# Patient Record
Sex: MALE | Race: WHITE | NOT HISPANIC OR LATINO | Employment: OTHER | URBAN - METROPOLITAN AREA
[De-identification: names, ages, dates, MRNs, and addresses within clinical notes are randomized per-mention and may not be internally consistent; named-entity substitution may affect disease eponyms.]

---

## 2018-10-27 ENCOUNTER — HOSPITAL ENCOUNTER (OUTPATIENT)
Dept: RADIOLOGY | Facility: MEDICAL CENTER | Age: 83
End: 2018-10-27

## 2018-10-27 ENCOUNTER — HOSPITAL ENCOUNTER (INPATIENT)
Facility: MEDICAL CENTER | Age: 83
LOS: 3 days | DRG: 552 | End: 2018-10-30
Attending: EMERGENCY MEDICINE | Admitting: HOSPITALIST
Payer: MEDICARE

## 2018-10-27 DIAGNOSIS — S12.112A CLOSED NONDISPLACED ODONTOID FRACTURE WITH TYPE II MORPHOLOGY, INITIAL ENCOUNTER (HCC): ICD-10-CM

## 2018-10-27 DIAGNOSIS — J96.11 CHRONIC RESPIRATORY FAILURE WITH HYPOXIA (HCC): ICD-10-CM

## 2018-10-27 PROBLEM — I48.91 AF (ATRIAL FIBRILLATION) (HCC): Status: ACTIVE | Noted: 2018-10-27

## 2018-10-27 PROBLEM — S12.110A DENS FRACTURE (HCC): Status: ACTIVE | Noted: 2018-10-27

## 2018-10-27 PROBLEM — E78.5 HLD (HYPERLIPIDEMIA): Status: ACTIVE | Noted: 2018-10-27

## 2018-10-27 PROBLEM — I50.9 CHF (CONGESTIVE HEART FAILURE) (HCC): Status: ACTIVE | Noted: 2018-10-27

## 2018-10-27 LAB — EKG IMPRESSION: NORMAL

## 2018-10-27 PROCEDURE — 93005 ELECTROCARDIOGRAM TRACING: CPT | Performed by: EMERGENCY MEDICINE

## 2018-10-27 PROCEDURE — 770001 HCHG ROOM/CARE - MED/SURG/GYN PRIV*

## 2018-10-27 PROCEDURE — 304561 HCHG STAT O2

## 2018-10-27 PROCEDURE — 99285 EMERGENCY DEPT VISIT HI MDM: CPT

## 2018-10-27 PROCEDURE — 36415 COLL VENOUS BLD VENIPUNCTURE: CPT

## 2018-10-27 PROCEDURE — 700102 HCHG RX REV CODE 250 W/ 637 OVERRIDE(OP): Performed by: HOSPITALIST

## 2018-10-27 PROCEDURE — 99223 1ST HOSP IP/OBS HIGH 75: CPT | Performed by: HOSPITALIST

## 2018-10-27 PROCEDURE — A9270 NON-COVERED ITEM OR SERVICE: HCPCS | Performed by: HOSPITALIST

## 2018-10-27 PROCEDURE — 93005 ELECTROCARDIOGRAM TRACING: CPT

## 2018-10-27 RX ORDER — METOPROLOL SUCCINATE 25 MG/1
25 TABLET, EXTENDED RELEASE ORAL DAILY
COMMUNITY

## 2018-10-27 RX ORDER — PRAVASTATIN SODIUM 10 MG
10 TABLET ORAL NIGHTLY
COMMUNITY

## 2018-10-27 RX ORDER — GUAIFENESIN/DEXTROMETHORPHAN 100-10MG/5
10 SYRUP ORAL EVERY 6 HOURS PRN
Status: DISCONTINUED | OUTPATIENT
Start: 2018-10-27 | End: 2018-10-30 | Stop reason: HOSPADM

## 2018-10-27 RX ORDER — ACETAMINOPHEN 325 MG/1
650 TABLET ORAL EVERY 6 HOURS PRN
Status: DISCONTINUED | OUTPATIENT
Start: 2018-10-27 | End: 2018-10-30 | Stop reason: HOSPADM

## 2018-10-27 RX ORDER — TRAMADOL HYDROCHLORIDE 50 MG/1
50 TABLET ORAL EVERY 6 HOURS PRN
Status: DISCONTINUED | OUTPATIENT
Start: 2018-10-27 | End: 2018-10-30 | Stop reason: HOSPADM

## 2018-10-27 RX ORDER — BISACODYL 10 MG
10 SUPPOSITORY, RECTAL RECTAL
Status: DISCONTINUED | OUTPATIENT
Start: 2018-10-27 | End: 2018-10-30 | Stop reason: HOSPADM

## 2018-10-27 RX ORDER — AMOXICILLIN 250 MG
2 CAPSULE ORAL 2 TIMES DAILY
Status: DISCONTINUED | OUTPATIENT
Start: 2018-10-27 | End: 2018-10-30 | Stop reason: HOSPADM

## 2018-10-27 RX ORDER — PRAVASTATIN SODIUM 10 MG
10 TABLET ORAL NIGHTLY
Status: DISCONTINUED | OUTPATIENT
Start: 2018-10-27 | End: 2018-10-30 | Stop reason: HOSPADM

## 2018-10-27 RX ORDER — POTASSIUM CHLORIDE 20 MEQ/1
20 TABLET, EXTENDED RELEASE ORAL 2 TIMES DAILY
Status: DISCONTINUED | OUTPATIENT
Start: 2018-10-27 | End: 2018-10-30 | Stop reason: HOSPADM

## 2018-10-27 RX ORDER — POLYETHYLENE GLYCOL 3350 17 G/17G
1 POWDER, FOR SOLUTION ORAL
Status: DISCONTINUED | OUTPATIENT
Start: 2018-10-27 | End: 2018-10-30 | Stop reason: HOSPADM

## 2018-10-27 RX ORDER — FUROSEMIDE 40 MG/1
40 TABLET ORAL EVERY MORNING
Status: DISCONTINUED | OUTPATIENT
Start: 2018-10-28 | End: 2018-10-28

## 2018-10-27 RX ORDER — METOPROLOL SUCCINATE 25 MG/1
25 TABLET, EXTENDED RELEASE ORAL DAILY
Status: DISCONTINUED | OUTPATIENT
Start: 2018-10-28 | End: 2018-10-30 | Stop reason: HOSPADM

## 2018-10-27 RX ORDER — FUROSEMIDE 20 MG/1
40 TABLET ORAL EVERY MORNING
Status: ON HOLD | COMMUNITY
End: 2018-10-28

## 2018-10-27 RX ORDER — TEMAZEPAM 15 MG/1
7.5 CAPSULE ORAL NIGHTLY PRN
Status: ON HOLD | COMMUNITY
End: 2018-10-28

## 2018-10-27 RX ORDER — POTASSIUM CHLORIDE 20 MEQ/1
20 TABLET, EXTENDED RELEASE ORAL 3 TIMES DAILY
COMMUNITY

## 2018-10-27 RX ADMIN — POTASSIUM CHLORIDE 20 MEQ: 1500 TABLET, EXTENDED RELEASE ORAL at 21:24

## 2018-10-27 RX ADMIN — STANDARDIZED SENNA CONCENTRATE AND DOCUSATE SODIUM 2 TABLET: 8.6; 5 TABLET, FILM COATED ORAL at 21:24

## 2018-10-27 RX ADMIN — PRAVASTATIN SODIUM 10 MG: 10 TABLET ORAL at 21:54

## 2018-10-27 ASSESSMENT — COGNITIVE AND FUNCTIONAL STATUS - GENERAL
MOBILITY SCORE: 12
DAILY ACTIVITIY SCORE: 19
SUGGESTED CMS G CODE MODIFIER MOBILITY: CL
WALKING IN HOSPITAL ROOM: A LOT
SUGGESTED CMS G CODE MODIFIER DAILY ACTIVITY: CK
HELP NEEDED FOR BATHING: A LITTLE
DRESSING REGULAR LOWER BODY CLOTHING: A LITTLE
MOVING FROM LYING ON BACK TO SITTING ON SIDE OF FLAT BED: A LOT
TURNING FROM BACK TO SIDE WHILE IN FLAT BAD: A LITTLE
DRESSING REGULAR UPPER BODY CLOTHING: A LITTLE
MOVING TO AND FROM BED TO CHAIR: A LOT
CLIMB 3 TO 5 STEPS WITH RAILING: TOTAL
STANDING UP FROM CHAIR USING ARMS: A LOT
TOILETING: A LOT

## 2018-10-27 ASSESSMENT — ENCOUNTER SYMPTOMS
SENSORY CHANGE: 0
DIARRHEA: 0
CHILLS: 0
TINGLING: 0
PHOTOPHOBIA: 0
SHORTNESS OF BREATH: 0
DIZZINESS: 0
MYALGIAS: 0
SORE THROAT: 0
WHEEZING: 0
NECK PAIN: 1
ABDOMINAL PAIN: 0
PALPITATIONS: 0
FEVER: 0
FOCAL WEAKNESS: 0
HEADACHES: 0
VOMITING: 0
COUGH: 0
NAUSEA: 0
DEPRESSION: 0

## 2018-10-27 ASSESSMENT — COPD QUESTIONNAIRES
COPD SCREENING SCORE: 4
COPD SCREENING SCORE: 3
DURING THE PAST 4 WEEKS HOW MUCH DID YOU FEEL SHORT OF BREATH: NONE/LITTLE OF THE TIME
DO YOU EVER COUGH UP ANY MUCUS OR PHLEGM?: NO/ONLY WITH OCCASIONAL COLDS OR INFECTIONS
HAVE YOU SMOKED AT LEAST 100 CIGARETTES IN YOUR ENTIRE LIFE: YES
HAVE YOU SMOKED AT LEAST 100 CIGARETTES IN YOUR ENTIRE LIFE: YES
DURING THE PAST 4 WEEKS HOW MUCH DID YOU FEEL SHORT OF BREATH: NONE/LITTLE OF THE TIME
DO YOU EVER COUGH UP ANY MUCUS OR PHLEGM?: NO/ONLY WITH OCCASIONAL COLDS OR INFECTIONS

## 2018-10-27 ASSESSMENT — LIFESTYLE VARIABLES
DO YOU DRINK ALCOHOL: NO
EVER_SMOKED: YES

## 2018-10-27 ASSESSMENT — PAIN SCALES - GENERAL: PAINLEVEL_OUTOF10: 0

## 2018-10-27 ASSESSMENT — PATIENT HEALTH QUESTIONNAIRE - PHQ9
2. FEELING DOWN, DEPRESSED, IRRITABLE, OR HOPELESS: NOT AT ALL
SUM OF ALL RESPONSES TO PHQ9 QUESTIONS 1 AND 2: 0
1. LITTLE INTEREST OR PLEASURE IN DOING THINGS: NOT AT ALL

## 2018-10-27 NOTE — ED TRIAGE NOTES
Ankur Negron  Pt [facility]. Pt changed into gown and placed on monitor.     Chief Complaint   Patient presents with   • T-5000 GLF     Pt transferred from [facility]. Per report, pt had a MGLF yesterday at his home. Pt stated that neck was stiff after the fall. Pt was seen in [facility] ED d/t neck pain and CT was done of cervical spine. Pt was found to have nondisplaced transverse fracture of the odontoid process of C2. Pt in C-spine precautions upon arrival. Pt was also found to have benign meningioma overlying the superior right parietal lobe. No neuro deficits noted. Pt transferred for neuro consult and [facility] level of care.  Chart up and ready for ERP now.

## 2018-10-28 PROBLEM — J96.11 CHRONIC RESPIRATORY FAILURE WITH HYPOXIA (HCC): Status: ACTIVE | Noted: 2018-10-28

## 2018-10-28 PROBLEM — R13.10 DYSPHAGIA: Status: ACTIVE | Noted: 2018-10-28

## 2018-10-28 PROBLEM — E55.9 VITAMIN D DEFICIENCY: Status: ACTIVE | Noted: 2018-10-28

## 2018-10-28 PROBLEM — N18.9 CHRONIC RENAL IMPAIRMENT: Status: ACTIVE | Noted: 2018-10-28

## 2018-10-28 PROBLEM — I48.20 CHRONIC ATRIAL FIBRILLATION (HCC): Status: ACTIVE | Noted: 2018-10-27

## 2018-10-28 PROBLEM — D53.9 MACROCYTIC ANEMIA: Status: ACTIVE | Noted: 2018-10-28

## 2018-10-28 PROBLEM — D72.829 LEUKOCYTOSIS: Status: ACTIVE | Noted: 2018-10-28

## 2018-10-28 LAB
ANION GAP SERPL CALC-SCNC: 7 MMOL/L (ref 0–11.9)
BUN SERPL-MCNC: 23 MG/DL (ref 8–22)
CALCIUM SERPL-MCNC: 9.1 MG/DL (ref 8.5–10.5)
CHLORIDE SERPL-SCNC: 105 MMOL/L (ref 96–112)
CO2 SERPL-SCNC: 28 MMOL/L (ref 20–33)
CREAT SERPL-MCNC: 1.11 MG/DL (ref 0.5–1.4)
ERYTHROCYTE [DISTWIDTH] IN BLOOD BY AUTOMATED COUNT: 54.2 FL (ref 35.9–50)
GLUCOSE SERPL-MCNC: 111 MG/DL (ref 65–99)
HCT VFR BLD AUTO: 37.5 % (ref 42–52)
HGB BLD-MCNC: 12.9 G/DL (ref 14–18)
MCH RBC QN AUTO: 34.5 PG (ref 27–33)
MCHC RBC AUTO-ENTMCNC: 34.4 G/DL (ref 33.7–35.3)
MCV RBC AUTO: 100.3 FL (ref 81.4–97.8)
PLATELET # BLD AUTO: 277 K/UL (ref 164–446)
PMV BLD AUTO: 10.2 FL (ref 9–12.9)
POTASSIUM SERPL-SCNC: 3.8 MMOL/L (ref 3.6–5.5)
RBC # BLD AUTO: 3.74 M/UL (ref 4.7–6.1)
SODIUM SERPL-SCNC: 140 MMOL/L (ref 135–145)
WBC # BLD AUTO: 11 K/UL (ref 4.8–10.8)

## 2018-10-28 PROCEDURE — 700102 HCHG RX REV CODE 250 W/ 637 OVERRIDE(OP): Performed by: NURSE PRACTITIONER

## 2018-10-28 PROCEDURE — G8979 MOBILITY GOAL STATUS: HCPCS | Mod: CI

## 2018-10-28 PROCEDURE — 97161 PT EVAL LOW COMPLEX 20 MIN: CPT

## 2018-10-28 PROCEDURE — 85027 COMPLETE CBC AUTOMATED: CPT

## 2018-10-28 PROCEDURE — A9270 NON-COVERED ITEM OR SERVICE: HCPCS | Performed by: NURSE PRACTITIONER

## 2018-10-28 PROCEDURE — G8978 MOBILITY CURRENT STATUS: HCPCS | Mod: CK

## 2018-10-28 PROCEDURE — A9270 NON-COVERED ITEM OR SERVICE: HCPCS | Performed by: HOSPITALIST

## 2018-10-28 PROCEDURE — 770001 HCHG ROOM/CARE - MED/SURG/GYN PRIV*

## 2018-10-28 PROCEDURE — 700102 HCHG RX REV CODE 250 W/ 637 OVERRIDE(OP): Performed by: HOSPITALIST

## 2018-10-28 PROCEDURE — 99232 SBSQ HOSP IP/OBS MODERATE 35: CPT | Mod: GC | Performed by: INTERNAL MEDICINE

## 2018-10-28 PROCEDURE — 36415 COLL VENOUS BLD VENIPUNCTURE: CPT

## 2018-10-28 PROCEDURE — 80048 BASIC METABOLIC PNL TOTAL CA: CPT

## 2018-10-28 RX ORDER — FUROSEMIDE 80 MG
80 TABLET ORAL DAILY
COMMUNITY

## 2018-10-28 RX ORDER — FUROSEMIDE 40 MG/1
80 TABLET ORAL EVERY MORNING
Status: DISCONTINUED | OUTPATIENT
Start: 2018-10-29 | End: 2018-10-30 | Stop reason: HOSPADM

## 2018-10-28 RX ADMIN — ACETAMINOPHEN 650 MG: 325 TABLET, FILM COATED ORAL at 04:47

## 2018-10-28 RX ADMIN — POTASSIUM CHLORIDE 20 MEQ: 1500 TABLET, EXTENDED RELEASE ORAL at 04:47

## 2018-10-28 RX ADMIN — RIVAROXABAN 15 MG: 15 TABLET, FILM COATED ORAL at 04:47

## 2018-10-28 RX ADMIN — PRAVASTATIN SODIUM 10 MG: 10 TABLET ORAL at 22:29

## 2018-10-28 RX ADMIN — VITAMIN D, TAB 1000IU (100/BT) 1000 UNITS: 25 TAB at 17:38

## 2018-10-28 RX ADMIN — FUROSEMIDE 40 MG: 40 TABLET ORAL at 04:47

## 2018-10-28 RX ADMIN — STANDARDIZED SENNA CONCENTRATE AND DOCUSATE SODIUM 2 TABLET: 8.6; 5 TABLET, FILM COATED ORAL at 04:47

## 2018-10-28 RX ADMIN — POTASSIUM CHLORIDE 20 MEQ: 1500 TABLET, EXTENDED RELEASE ORAL at 17:38

## 2018-10-28 RX ADMIN — STANDARDIZED SENNA CONCENTRATE AND DOCUSATE SODIUM 2 TABLET: 8.6; 5 TABLET, FILM COATED ORAL at 17:38

## 2018-10-28 RX ADMIN — METOPROLOL SUCCINATE 25 MG: 25 TABLET, EXTENDED RELEASE ORAL at 04:47

## 2018-10-28 ASSESSMENT — ENCOUNTER SYMPTOMS
WEAKNESS: 0
DEPRESSION: 0
FEVER: 0
SPUTUM PRODUCTION: 0
PALPITATIONS: 0
CLAUDICATION: 0
VOMITING: 0
NERVOUS/ANXIOUS: 0
ORTHOPNEA: 0
SPEECH CHANGE: 0
TREMORS: 0
HEADACHES: 0
NAUSEA: 0
WHEEZING: 0
ABDOMINAL PAIN: 0
PND: 0
HEMOPTYSIS: 0
TINGLING: 0
FOCAL WEAKNESS: 0
LOSS OF CONSCIOUSNESS: 0
DIAPHORESIS: 0
DIZZINESS: 0
SHORTNESS OF BREATH: 0
SENSORY CHANGE: 0
CONSTIPATION: 0
BLOOD IN STOOL: 0
CHILLS: 0
COUGH: 0
BRUISES/BLEEDS EASILY: 1
INSOMNIA: 0
DIARRHEA: 0

## 2018-10-28 ASSESSMENT — GAIT ASSESSMENTS
DISTANCE (FEET): 100
GAIT LEVEL OF ASSIST: CONTACT GUARD ASSIST
ASSISTIVE DEVICE: FRONT WHEEL WALKER

## 2018-10-28 ASSESSMENT — COGNITIVE AND FUNCTIONAL STATUS - GENERAL
CLIMB 3 TO 5 STEPS WITH RAILING: A LOT
WALKING IN HOSPITAL ROOM: A LITTLE
MOVING TO AND FROM BED TO CHAIR: A LITTLE
MOVING FROM LYING ON BACK TO SITTING ON SIDE OF FLAT BED: A LITTLE
TURNING FROM BACK TO SIDE WHILE IN FLAT BAD: A LITTLE
MOBILITY SCORE: 17
STANDING UP FROM CHAIR USING ARMS: A LITTLE
SUGGESTED CMS G CODE MODIFIER MOBILITY: CK

## 2018-10-28 ASSESSMENT — PAIN SCALES - GENERAL
PAINLEVEL_OUTOF10: 0

## 2018-10-28 ASSESSMENT — PATIENT HEALTH QUESTIONNAIRE - PHQ9
1. LITTLE INTEREST OR PLEASURE IN DOING THINGS: NOT AT ALL
2. FEELING DOWN, DEPRESSED, IRRITABLE, OR HOPELESS: NOT AT ALL
SUM OF ALL RESPONSES TO PHQ9 QUESTIONS 1 AND 2: 0

## 2018-10-28 NOTE — ED NOTES
Received report. Assumed care of pt at this time. Pt alert and oriented x4, GCS 15, resp even and nl. Pt on full cardiac monitor. Bed locked in low position, call light within reach. Denies needs at this time. Will continue to monitor.

## 2018-10-28 NOTE — CARE PLAN
Problem: Bowel/Gastric:  Goal: Will not experience complications related to bowel motility  Outcome: PROGRESSING AS EXPECTED      Problem: Knowledge Deficit  Goal: Knowledge of disease process/condition, treatment plan, diagnostic tests, and medications will improve  Outcome: PROGRESSING AS EXPECTED

## 2018-10-28 NOTE — CONSULTS
DATE OF SERVICE:  10/28/2018    ADMITTING DIAGNOSIS:  Ground level fall with odontoid fracture, type 2.    HISTORY OF PRESENT ILLNESS:  This 89-year-old patient suffered a ground level   fall on 10/27/2018.  He was initially evaluated in [place] at the local   facility and was then transferred to the [facility] for   further workup and care based on the finding of an odontoid fracture.  Dr. Tong Boyce, neurosurgery was consulted.    PAST MEDICAL HISTORY:  Pertinent for atrial fibrillation, chronic kidney   disease, congestive heart failure, dyslipidemia, hypertension, obstructive   sleep apnea.    SURGICAL HISTORY:  Denies.    ADMITTING MEDICATIONS:  Include metoprolol SR, pravastatin, Xarelto, Restoril.    ALLERGIES:  No known allergies.    REVIEW OF SYSTEMS:  Negative for any acute changes in his cardiopulmonary   status.  He remains continent of bowel and bladder.  He has had no untoward   bleeding while on the Xarelto.    PHYSICAL EXAMINATION:  This patient is awake, alert and oriented.  He is   presenting in a [place] J collar.  He is neuromotor and neurosensory ntact.    IMPRESSION AND PLAN:  Type 2 odontoid fracture with significant ankylosis.    PLAN:  At this point, no neurosurgical fixation of the odontoid fracture is   planned.  We are recommending strict bracing.  Based on the patient's age and the   nature and location of the fracture, bracing may require 3-6 months before   healing.  A bone growth stimulator may be considered on an outpatient basis.    We will also recommend continued observation and repeat CT scan in the next   few days to ensure that the fracture remained stable.  The patient may be   started on therapies.       ____________________________________     LUIS MORRELL / NESTOR    DD:  10/28/2018 11:01:04  DT:  10/28/2018 11:22:46    D#:  [id number]  Job#:  [id number]

## 2018-10-28 NOTE — H&P
Hospital Medicine History & Physical Note    Date of Service  10/27/2018    Primary Care Physician  No primary care provider on file.    Consultants  Dr. Boyce, neurosurgery    Code Status  Full    Chief Complaint  Chief Complaint   Patient presents with   • T-5000 GLF       History of Presenting Illness  89 y.o. male who presented on 10/27/2018 with neck pain after a fall.  The patient states that he typically ambulates with a frontwheel walker.  He was in his hallway in his home when his pants fell down, he let go of his walker to pull up his pants when his left leg gave out and he fell forward and struck the left front part of his head.  He denies any loss of consciousness.  He denies any preceding headache, chest pain, shortness of breath, or dizziness.  He was able to get up on his own and had some neck soreness but he did not seek immediate medical assistance and in fact was able to sleep for the rest of last night.  Today, his neck is painful and he was planning on putting a hot pack on it when he could not find it and his nephew decided to call EMS.  Patient was seen at an outside facility where he was noted to have a dens fracture on CT imaging.  He was transferred to our hospital for higher level of care and specialist consultation.    Review of Systems  Review of Systems   Constitutional: Negative for chills and fever.   HENT: Negative for congestion and sore throat.    Eyes: Negative for photophobia.   Respiratory: Negative for cough, shortness of breath and wheezing.    Cardiovascular: Negative for chest pain and palpitations.   Gastrointestinal: Negative for abdominal pain, diarrhea, nausea and vomiting.   Genitourinary: Negative for dysuria.   Musculoskeletal: Positive for neck pain (Now resolved). Negative for myalgias.   Skin: Negative.    Neurological: Negative for dizziness, tingling, focal weakness and headaches.   Psychiatric/Behavioral: Negative for depression and suicidal ideas.       Past  Medical History  Past Medical History:   Diagnosis Date   • Atrial fibrillation (HCC)    • Chronic ischemic heart disease    • Chronic renal impairment    • Congestive heart failure (HCC)    • Dysphagia    • Edema    • Endocarditis    • Hyperlipidemia    • Hypertension    • Hypoxemia    • Malaise and fatigue    • Obstructive sleep apnea syndrome in adult        Surgical History  Tonsillectomy    Family History  History reviewed. No pertinent family history.    Social History  Social History   Substance Use Topics   • Smoking status: Never Smoker   • Smokeless tobacco: Never Used   • Alcohol use Yes      Comment: occ       Allergies  No Known Allergies    Medications  No current facility-administered medications on file prior to encounter.      No current outpatient prescriptions on file prior to encounter.       Physical Exam  Hemodynamics  Temp (24hrs), Av.5 °C (97.7 °F), Min:36.5 °C (97.7 °F), Max:36.5 °C (97.7 °F)   Temperature: 36.5 °C (97.7 °F)  Pulse  Av  Min: 54  Max: 75 Heart Rate (Monitored): 68  NIBP: 126/63      Respiratory      Respiration: 19, Pulse Oximetry: 94 %             Physical Exam   Constitutional: He is oriented to person, place, and time. No distress.   Pleasant, elderly, frail   HENT:   Head: Normocephalic and atraumatic.   Right Ear: External ear normal.   Left Ear: External ear normal.   Cervical immobilizer   Eyes: EOM are normal. Right eye exhibits no discharge. Left eye exhibits no discharge.   Neck: Neck supple. No JVD present.   Cardiovascular: Normal rate, regular rhythm and normal heart sounds.    Pulmonary/Chest: Effort normal and breath sounds normal. No respiratory distress. He exhibits no tenderness.   Abdominal: Soft. Bowel sounds are normal. He exhibits no distension. There is no tenderness.   Musculoskeletal: He exhibits no edema.   Neurological: He is alert and oriented to person, place, and time. No cranial nerve deficit.   Skin: Skin is dry. He is not diaphoretic.  No erythema.   Psychiatric: He has a normal mood and affect. His behavior is normal.   Nursing note and vitals reviewed.    Capillary refill less than 3 seconds, distal pulses intact    Laboratory:          No results for input(s): ALTSGPT, ASTSGOT, ALKPHOSPHAT, TBILIRUBIN, DBILIRUBIN, GAMMAGT, AMYLASE, LIPASE, ALB, PREALBUMIN, GLUCOSE in the last 72 hours.              No results found for: TROPONINI    Imaging  No results found.      Assessment/Plan:  Anticipate that patient will need greater than 2 midnights for management of the discussed medical issues.    * Dens fracture (HCC)   Assessment & Plan    Status post ground-level fall, patient currently is in a cervical immobilizer which we will continue.  The patient will be admitted to the neurosurgical floor for every 4-hour neurology checks.  He will receive symptomatic management for pain.  We will place him on respiratory therapy protocol, he is currently protecting his airway.  He will be kept n.p.o. at midnight in the event that neurosurgery would like to proceed to any procedures in the morning.  Dr. Boyce was consulted by the emergency room physician and I look forward his recommendations.        CHF (congestive heart failure) ([facility])   Assessment & Plan    Unknown this is right-sided or left-sided heart failure, no previous records to review.  Continue home Lasix, currently euvolemic.        AF (atrial fibrillation) ([facility])   Assessment & Plan    This is chronic, continue home metoprolol and Xarelto, heart rate is currently well controlled.  Will need to hold Xarelto if there is any surgical intervention required.        HLD (hyperlipidemia)   Assessment & Plan    This is chronic and stable, no chest pain, continue home Pravachol.            Prophylaxis: Patient is on Xarelto, no additional need for DVT prophylaxis, no PPI indicated, bowel protocol as needed

## 2018-10-28 NOTE — PROGRESS NOTES
0745Report received, plan of care reviewed and discussed, assessment complete, oriented to room, bed alarm on, nonskid socks applied, advised to call for assistance. Ambulating in the lynn with PT.  0945 Discussed plan of care, encouraged and answered all questions, will continue to monitor.  1145 Resting, all needs met at this time.  1345 Incontinent of bowel and bladder, linens changed, up to the bathroom and ambulated lynn, will continue to monitor.  1545 Up in bed, no complaints.  1845 Report given to next shift.

## 2018-10-28 NOTE — DISCHARGE PLANNING
Received Choice form at [phone]  Agency/Facility Name: [facility]  Referral sent per Choice form @ [phone]

## 2018-10-28 NOTE — ED NOTES
Pt given extra padding for c-collar upon request. Extra padding placed on chin of [place] J collar.

## 2018-10-28 NOTE — DISCHARGE PLANNING
Care Transition Team Assessment    Information Source  Orientation : Oriented x 4  Information Given By: Patient  Who is responsible for making decisions for patient? : Patient         Elopement Risk  Legal Hold: No  Ambulatory or Self Mobile in Wheelchair: No-Not an Elopement Risk  Elopement Risk: Not at Risk for Elopement    Interdisciplinary Discharge Planning  Does Admitting Nurse Feel This Could be a Complex Discharge?: No  Primary Care Physician: does not remember (MD is in [place], [place])  Lives with - Patient's Self Care Capacity: Other (Comments)  Patient or legal guardian wants to designate a caregiver (see row info):  (nephew)  Support Systems: Family Member(s)  Housing / Facility: Mobile Home  Do You Take your Prescribed Medications Regularly: Yes  Able to Return to Previous ADL's: Yes  Mobility Issues: Yes  Prior Services: Home-Independent (nephew helps him at home)  Patient Expects to be Discharged to:: Home with Homehealth  Assistance Needed: Yes  Durable Medical Equipment: Walker    Discharge Preparedness  What is your plan after discharge?: Home with help, Home health care  What are your discharge supports?: Other (comment) (nephew)  Prior Functional Level: Ambulatory, Independent with Activities of Daily Living  Difficulity with ADLs: Bathing, Walking  Difficulity with IADLs: Cooking, Driving, Laundry    Functional Assesment  Prior Functional Level: Ambulatory, Independent with Activities of Daily Living    Finances  Financial Barriers to Discharge: No  Prescription Coverage: Yes    Vision / Hearing Impairment  Vision Impairment : Yes  Right Eye Vision: Impaired, Wears Glasses  Left Eye Vision: Impaired, Wears Glasses  Hearing Impairment : Yes  Hearing Impairment: Both Ears  Does Pt Need Special Equipment for the Hearing Impaired?: No    Values / Beliefs / Concerns  Values / Beliefs Concerns : No         Domestic Abuse  Have you ever been the victim of abuse or violence?: No  Physical Abuse or  Sexual Abuse: No  Verbal Abuse or Emotional Abuse: No              Anticipated Discharge Information  Anticipated discharge disposition: [facility] [place]

## 2018-10-28 NOTE — PROGRESS NOTES
[facility] Hospitalist Progress Note    Date of Service: 10/28/2018    Chief Complaint  89 y.o. male transferred from [place] 10/27/2018 with a left forehead abrasion & neck stiffness following a ground level fall. He was found to have a stable type 2 odontoid fracture & neurosurgery was consulted, who recommended c-collar x 3-6 months & repeat imaging in 1-2 days.    Interval Problem Update  Feels good.  Denies pain. New c-collar was just placed by traction, states it is more comfortable than the one prior. No sensorimotor deficits. No bowel or bladder incontinence.  Labs stable.  Afebrile overnight, other VSS, on 2L of O2 via NC (his baseline).  Nsx recommending observation for now with repeat CT scan in 2-3 days to ensure fracture remains stable.    Consultants/Specialty  Neurosurgery-Dr. Boyce    Disposition  Home with  when cleared by nsx.      Review of Systems   Constitutional: Negative for chills, diaphoresis, fever and malaise/fatigue.   HENT: Positive for hearing loss.    Respiratory: Negative for cough, hemoptysis, sputum production, shortness of breath and wheezing.    Cardiovascular: Positive for leg swelling. Negative for chest pain, palpitations, orthopnea, claudication and PND.   Gastrointestinal: Negative for abdominal pain, blood in stool, constipation, diarrhea, melena, nausea and vomiting.   Genitourinary: Negative for dysuria, frequency, hematuria and urgency.   Musculoskeletal:        Denies pain   Neurological: Negative for dizziness, tingling, tremors, sensory change, speech change, focal weakness, loss of consciousness, weakness and headaches.   Endo/Heme/Allergies: Bruises/bleeds easily.   Psychiatric/Behavioral: Negative for depression. The patient is not nervous/anxious and does not have insomnia.    All other systems reviewed and are negative.     Physical Exam  Laboratory/Imaging   Hemodynamics  Temp (24hrs), Av.8 °C (98.2 °F), Min:36.6 °C (97.9 °F), Max:36.9 °C (98.5 °F)   Temperature:  36.8 °C (98.3 °F)  Pulse  Av.9  Min: 54  Max: 80 Heart Rate (Monitored): 79  Blood Pressure : 112/56, NIBP: 124/79      Respiratory  Respiration: 18, Pulse Oximetry: 92 %, O2 Daily Delivery Respiratory : Silicone Nasal Cannula  Work Of Breathing / Effort: Mild  RUL Breath Sounds: Clear, RML Breath Sounds: Clear, RLL Breath Sounds: Diminished, JEZ Breath Sounds: Clear, LLL Breath Sounds: Diminished    Fluids    Intake/Output Summary (Last 24 hours) at 10/28/18 1645  Last data filed at 10/28/18 1200   Gross per 24 hour   Intake              680 ml   Output                0 ml   Net              680 ml     Nutrition  Orders Placed This Encounter   Procedures   • Diet Order Cardiac     Standing Status:   Standing     Number of Occurrences:   1     Order Specific Question:   Diet:     Answer:   Cardiac [6]     Order Specific Question:   Consistency/Fluid modifications:     Answer:   Nectar Thick [2]     Physical Exam   Constitutional: He is oriented to person, place, and time. He appears well-developed and well-nourished. He is active and cooperative. No distress. Nasal cannula in place.   HENT:   Head: Normocephalic and atraumatic.   Right Ear: External ear normal. Decreased hearing is noted.   Left Ear: External ear normal. Decreased hearing is noted.   Nose: Nose normal.   Mouth/Throat: Abnormal dentition.   Eyes: Pupils are equal, round, and reactive to light. EOM are normal. Right eye exhibits no discharge. Left eye exhibits no discharge. No scleral icterus.   Neck: Normal range of motion. Neck supple. No JVD present.   Cardiovascular: Normal rate, normal heart sounds and intact distal pulses.  An irregular rhythm present. Exam reveals no gallop and no friction rub.    No murmur heard.  Pulmonary/Chest: Effort normal. No accessory muscle usage or stridor. No respiratory distress. He has decreased breath sounds in the right lower field and the left lower field. He has no wheezes. He has no rhonchi. He has no  rales.   Abdominal: Soft. He exhibits no distension. Bowel sounds are decreased. There is no tenderness. There is no rebound and no guarding.   Centrally obese   Musculoskeletal: Normal range of motion. He exhibits edema (BLE, 1+ pitting).   Neurological: He is alert and oriented to person, place, and time. He has normal strength. No cranial nerve deficit or sensory deficit. GCS eye subscore is 4. GCS verbal subscore is 5. GCS motor subscore is 6.   5/5 strength throughout   Skin: Skin is warm and dry. Bruising (scattered) noted. No rash noted. He is not diaphoretic. No erythema. No pallor.        Psychiatric: He has a normal mood and affect. His speech is normal and behavior is normal. Judgment and thought content normal. Cognition and memory are normal.   Very pleasant   Nursing note and vitals reviewed.      Recent Labs      10/28/18   0211   WBC  11.0*   RBC  3.74*   HEMOGLOBIN  12.9*   HEMATOCRIT  37.5*   MCV  100.3*   MCH  34.5*   MCHC  34.4   RDW  54.2*   PLATELETCT  277   MPV  10.2     Recent Labs      10/28/18   0211   SODIUM  140   POTASSIUM  3.8   CHLORIDE  105   CO2  28   GLUCOSE  111*   BUN  23*   CREATININE  1.11   CALCIUM  9.1                      Assessment/Plan     * Dens fracture, type 2- (present on admission)   Assessment & Plan    No surgical intervention planned by nsx. Continue c-collar, anticipated need of 3-6 months.    Pain meds available if needed.  No evidence of neuro impairment.  Neurosurgery Dr. Boyce is following.        Leukocytosis- (present on admission)   Assessment & Plan    Minimally elevated. Afebrile. No evidence of infection. Likely reactive.  Recheck tomorrow.        Dysphagia, chronic- (present on admission)   Assessment & Plan    Was on thickened liquids prior to admission.  This was continued here with explicit instructions to hold PO & consult SLP for swallow eval if there are any s/s of aspiration.        Vitamin D deficiency   Assessment & Plan    Continue  supplementation.  Obtain level tomorrow morning.        Macrocytic anemia- (present on admission)   Assessment & Plan    Hgb 12.9.  Workup with a.m. labs.        Chronic renal impairment- (present on admission)   Assessment & Plan    Creat WNL at 1.11.  Reportedly on reduced dose of xarelto due to this.  Trend with BMP.        Chronic respiratory failure with hypoxia (HCC)- (present on admission)   Assessment & Plan    No acute exacerbation.  On 2 LPM of O2 at home.  Currently at baseline here.  RT protocol ordered.        CHF (congestive heart failure) (HCC)- (present on admission)   Assessment & Plan    Appears euvolemic.  No evidence of exacerbation.  Unknown if diastolic, systolic, or combined.   Continue home lasix & monitor closely.        Chronic atrial fibrillation (HCC)- (present on admission)   Assessment & Plan    No surgical intervention planned.  Xarelto has been restarted.  HR controlled.  Continue home dose toprol.        HLD (hyperlipidemia)- (present on admission)   Assessment & Plan    Continue home pravastatin.  Obtain lipid panel with a.m. labs.          Quality-Core Measures   Reviewed items::  Radiology images reviewed, Labs reviewed and Medications reviewed  Joe catheter::  No Joe  DVT: xarelto.  DVT prophylaxis - mechanical:  SCDs  Ulcer Prophylaxis::  No  Assessed for rehabilitation services:  Patient was assess for and/or received rehabilitation services during this hospitalization

## 2018-10-28 NOTE — ASSESSMENT & PLAN NOTE
Appears euvolemic.  No evidence of exacerbation.  Unknown if diastolic, systolic, or combined.   Continue home lasix & monitor closely.

## 2018-10-28 NOTE — ASSESSMENT & PLAN NOTE
Xarelto has been restarted, though this should be readdressed in the outpatient setting due to the patient falling at home.  He does have a very high [id number] score, which could be the reason he has been kept on it in the outpatient setting.  We will defer to his primary care physician for further management.  HR controlled.  Continue home dose toprol.

## 2018-10-28 NOTE — DISCHARGE PLANNING
Received Choice form at 0110  Agency/Facility Name: [facility]  However, no order    RN JOSE A uriarte

## 2018-10-28 NOTE — THERAPY
OT orders received. Per RN, pt possibly to have surgery today. Will hold OT eval until post-sx.    Soraya Dodson, OTR/L  Pager: [phone]

## 2018-10-28 NOTE — ED NOTES
Pt provided multiple warm blankets, pt verbalized understanding of waiting for hospitalist to order diet.

## 2018-10-28 NOTE — THERAPY
"89 y/o male adm for GLF, his pants fell and he reached down for them when his left knee gave way. Pt reports bad knees bilaterally. Dx: odontoid fx with no sx intervention recommended , use of [place] J collar . Intact sensation BLE. BLE with generalized weakness. Acute PT intervention to address transfers, gait with FWW, balance, strength and  activity tolerance for pt to achiev ehigher level of function to facilitate a safe DC.     Physical Therapy Evaluation completed.   Bed Mobility:  Supine to Sit: Stand by Assist  Transfers: Sit to Stand: Contact Guard Assist  Gait: Level Of Assist: Contact Guard Assist with Front-Wheel Walker       Plan of Care: Will benefit from Physical Therapy 5 times per week  Discharge Recommendations: Equipment: Will Continue to Assess for Equipment Needs. Post-acute therapy Discharge to home with  home health for additional skilled therapy services.    See \"Rehab Therapy-Acute\" Patient Summary Report for complete documentation.     "

## 2018-10-28 NOTE — PROGRESS NOTES
2 RNs inspected skin of patient. No evidence of acute skin alteration acquired in the hospital. Generalized bruising, abrasions on the head and swelling of four extremities are noted.

## 2018-10-28 NOTE — PROGRESS NOTES
Med rec updated and complete  Allergies reviewed  [facility]ed [facility] @ [phone], verify all prescription medications.  Pts pharmacy has pt on XARELTO 15MG since 2/2018.  Pt has not picked up an Rx for TEMAZEPAM 15MG since 2016.  Per pharmacy reports no antibiotics in the last 30 days.

## 2018-10-28 NOTE — ED NOTES
Pt alert and oriented, GCS 15, resp even and nl. Pt on 2L Oxygen for transport. All belongings with pt at this time.

## 2018-10-28 NOTE — PROGRESS NOTES
Patient seen and examined.    Most pleasant 89 yo male presents after a GLF yesterday with some a left forehead abrasion and some mild neck pain.    CT c spine shows a variant of a type 2 odontoid fracture and significant ankylosis or near ankylosis of the joints.    Nonfocal exam.    This appears to be a stable fracture.    Recommend observation on the floor and a repeat CT c spine in the next day or two to evaluate stability in a brace.    Recommend bracing x 3 - 6 months.    Recommend therapies.    Full consult to follow.

## 2018-10-28 NOTE — ASSESSMENT & PLAN NOTE
Neurosurgery recommending nonoperative intervention. Continue c-collar, anticipated need of 3-6 months.  Wants the patient to have a follow-up x-ray and office visit with  in 2 weeks.  Pain meds available if needed, but has not had any pain.  No evidence of neuro impairment.  Continue to encourage compliance.  Risks of not complying with c-collar have been extensively explained to the patient.

## 2018-10-28 NOTE — DISCHARGE PLANNING
Anticipated Discharge Disposition:   Home with homehealth    Action:   Met with pt.   He said that his nephew lives with him and helps him at home.   Pt has home O2 by [facility]   And he is agreeable with homehealth . Choice made and faxed to [facility].    Barriers to Discharge:   Pending medical clearance  Pending HH acceptance.     Plan:   Follow up with [facility].   Obtain HH order from hospitalist.

## 2018-10-28 NOTE — ED PROVIDER NOTES
ED Provider Note    Scribed for Kenneth Fleming M.D. by Lenny Coles. 10/27/2018, 5:27 PM.    Primary care provider: None noted  Means of arrival: EMS  History obtained from: Patient  History limited by: None    CHIEF COMPLAINT  Chief Complaint   Patient presents with   • T-5000 GLF       HPI  Ankur Negron is a 89 y.o. male who presents to the Emergency Department after suffering a ground level yesterday. Patient states he was walking down the [place] today when his pants fell down. He reached down to get his pants, when he fell over and hit his head, injuring his neck. He has some neck stiffness at this time and has a cervical collar in place. He states he feels well at this time and denies any fever, chills, nausea, vomiting, sensory changes, weakness. Ankur does not report any exacerbating or alleviating factors to his neck injury at this time.      REVIEW OF SYSTEMS  Review of Systems   Constitutional: Negative for chills and fever.   Gastrointestinal: Negative for nausea and vomiting.   Neurological: Negative for sensory change and focal weakness.   All other systems reviewed and are negative.      PAST MEDICAL HISTORY   has a past medical history of Atrial fibrillation (HCC); Chronic ischemic heart disease; Chronic renal impairment; Congestive heart failure (HCC); Dysphagia; Edema; Endocarditis; Hyperlipidemia; Hypertension; Hypoxemia; Malaise and fatigue; and Obstructive sleep apnea syndrome in adult.    SURGICAL HISTORY  patient denies any surgical history    SOCIAL HISTORY  Social History   Substance Use Topics   • Smoking status: Never Smoker   • Smokeless tobacco: Never Used   • Alcohol use Yes      Comment: occ      History   Drug Use No       FAMILY HISTORY  History reviewed. No pertinent family history.    CURRENT MEDICATIONS  Home Medications     Reviewed by Stephanie Espitia (Pharmacy Tech) on 10/27/18 at 1800  Med List Status: Partial   Medication Last Dose Status   Cholecalciferol  "(VITAMIN D3 PO) 10/27/2018 Active   furosemide (LASIX) 20 MG Tab 10/27/2018 Active   metoprolol SR (TOPROL XL) 25 MG TABLET SR 24 HR  Active   potassium chloride SA (KDUR) 20 MEQ Tab CR  Active   pravastatin (PRAVACHOL) 10 MG Tab  Active   rivaroxaban (XARELTO) 15 MG Tab tablet 10/27/2018 Active   temazepam (RESTORIL) 15 MG Cap 10/26/2018 Active                ALLERGIES  No Known Allergies    PHYSICAL EXAM  VITAL SIGNS: Pulse 71   Temp 36.5 °C (97.7 °F)   Resp 15   Ht 1.981 m (6' 6\")   Wt 72.6 kg (160 lb)   SpO2 96%   BMI 18.49 kg/m²     Constitutional: Well developed, Well nourished, No acute distress, Non-toxic appearance.   HENT: Abrasion to left side of head, Normocephalic, Bilateral external ears normal, oropharynx moist, No oral exudates, Nose normal.   Eyes: Conjunctiva is normal, there are no signs of exudate.   Neck: Neck in [place] J Collar, No meningeal signs.  Lymphatic: No lymphadenopathy noted.   Cardiovascular: Regular rate and rhythm without murmurs gallops or rubs.   Thorax & Lungs: Lungs sounds are diminished but clear, Lungs are clear to auscultation bilaterally, there are no wheezes no rales. Chest wall is nontender.  Abdomen: Soft, nontender, nondistended. Bowel sounds are present.   Skin: Warm, Dry, No erythema,   Back: Right side paraspinal tenderness,   Musculoskeletal: 2+ pitting edema bilaterally, No step off, Good range of motion in all major joints. No tenderness to palpation or major deformities noted. Intact distal pulses, no clubbing, no cyanosis,  Neurologic: Cranial nerves II-XII grossly intact, moving all 4 extremities, 5/5 strength in all 4 extremities, cerebellar functions intact, no other focal abnormalities.   Psychiatric: Affect normal, Judgment normal, Mood normal.     LABS  Results for orders placed or performed during the hospital encounter of 10/27/18   EKG (NOW)   Result Value Ref Range    Report       [facility] Emergency Dept.    Test Date:  " 2018-10-27  Pt Name:    TYE SALVADOR             Department: ER  MRN:        [id number]                      Room:        20  Gender:     Male                         Technician: [id number]  :        [id number]                   Requested By:ER TRIAGE PROTOCOL  Order #:    [id number]                    Reading MD: OMEGA TERRELL MD    Measurements  Intervals                                Axis  Rate:       73                           P:  NH:                                      QRS:        -71  QRSD:       156                          T:          -54  QT:         424  QTc:        468    Interpretive Statements  ATRIAL FIBRILLATION, V-RATE  59- 88  VENTRICULAR PREMATURE COMPLEX  RBBB AND LAFB  No previous ECG available for comparison    Electronically Signed On 10- 19:06:05 PDT by OMEGA TERRELL MD       All labs reviewed by me.    EKG  Interpreted by me    RADIOLOGY  OUTSIDE IMAGES-DX CHEST   Final Result      OUTSIDE IMAGES-CT CERVICAL SPINE   Final Result      OUTSIDE IMAGES-CT HEAD   Final Result        The radiologist's interpretation of all radiological studies have been reviewed by me.    COURSE & MEDICAL DECISION MAKING  Pertinent Labs & Imaging studies reviewed. (See chart for details)    5:27 PM - Patient seen and examined at bedside. Ordered EKG to evaluate his symptoms. Informed patient he will be admitted to the hospital for continued monitoring to which he understands and agrees.    5:40 PM - Paged Neuro Surgery    5:46 PM - I spoke with Dr. Patton, Neuro Surgery, who was given the patients medical record number and agrees to evaluate the patients case.    Decision Making:  Patient presents as a transfer from [place] emergency department he does have a type II dens fracture.  At this point spoke with Dr. patton who is recommended the patient be admitted to the floor spoke to the hospitalist for admission.  Chest x-ray was obtained as above.  EKG.  Patient has some preoperative laboratory  studies in the chart.    DISPOSITION:  Patient will be admitted to hospitalist in stable condition.     FINAL IMPRESSION  1. Closed nondisplaced odontoid fracture with type II morphology, initial encounter ([facility])          ILenny (Scribe), am scribing for, and in the presence of, Kenneth Fleming M.D..    Electronically signed by: Lenny Coles (Scribe), 10/27/2018    IKenneth M.D. personally performed the services described in this documentation, as scribed by Lenny Coles in my presence, and it is both accurate and complete. C.    The note accurately reflects work and decisions made by me.  Kenneth Fleming  10/27/2018  7:40 PM

## 2018-10-28 NOTE — FACE TO FACE
Face to Face Supporting Documentation - Home Health    The encounter with this patient was in whole or in part the primary reason for home health admission.    Date of encounter:   Patient:                    MRN:                       YOB: 2018  Ankur Negron  [id number]  8/13/1926     Home health to see patient for:  Skilled Nursing care for assessment, interventions & education, Physical Therapy evaluation and treatment and Occupational therapy evaluation and treatment    Skilled need for:  New Onset Medical Diagnosis Acute odontoid fracture secondary to fall    Skilled nursing interventions to include:  Comment: PT/OT    Homebound status evidenced by:  Needs the assistance of another person in order to leave the home. Leaving home requires a considerable and taxing effort. There is a normal inability to leave the home.    Community Physician to provide follow up care: No primary care provider on file.     Optional Interventions? No      I certify the face to face encounter for this home health care referral meets the CMS requirements and the encounter/clinical assessment with the patient was, in whole, or in part, for the medical condition(s) listed above, which is the primary reason for home health care. Based on my clinical findings: the service(s) are medically necessary, support the need for home health care, and the homebound criteria are met.  I certify that this patient has had a face to face encounter by myself.  ABHINAV Finch. - NPI: [id number]

## 2018-10-28 NOTE — ASSESSMENT & PLAN NOTE
No acute exacerbation.  On 2 LPM of O2 at home.  On room air at time of exam, but is known to not wear his oxygen when he is supposed to.  Home oxygen evaluation was done and was abnormal.  Oxygen delivery for anticipated transfer home tomorrow has been requested.  RT protocol ordered.

## 2018-10-28 NOTE — ED NOTES
Med Rec Updated PARTIALLY per Pt at bedside  Allergies Reviewed  No PO ABX last 30 days     Pt unable to recall medications    Pt knows he takes Xarelto 15mg Daily with his last dose 10/27/18 AM    Home Pharmacy closed     Tech to follow up in AM

## 2018-10-28 NOTE — CARE PLAN
Problem: Safety  Goal: Will remain free from injury  Outcome: PROGRESSING AS EXPECTED      Problem: Infection  Goal: Will remain free from infection  Outcome: PROGRESSING AS EXPECTED      Problem: Knowledge Deficit  Goal: Knowledge of disease process/condition, treatment plan, diagnostic tests, and medications will improve  Outcome: PROGRESSING AS EXPECTED

## 2018-10-29 PROBLEM — D63.8 ANEMIA OF CHRONIC DISEASE: Status: ACTIVE | Noted: 2018-10-28

## 2018-10-29 PROBLEM — D72.829 LEUKOCYTOSIS: Status: RESOLVED | Noted: 2018-10-28 | Resolved: 2018-10-29

## 2018-10-29 PROBLEM — Z91.199 MEDICALLY NONCOMPLIANT: Status: ACTIVE | Noted: 2018-10-29

## 2018-10-29 LAB
ALBUMIN SERPL BCP-MCNC: 3.3 G/DL (ref 3.2–4.9)
ALBUMIN/GLOB SERPL: 1.1 G/DL
ALP SERPL-CCNC: 56 U/L (ref 30–99)
ALT SERPL-CCNC: 7 U/L (ref 2–50)
ANION GAP SERPL CALC-SCNC: 7 MMOL/L (ref 0–11.9)
AST SERPL-CCNC: 10 U/L (ref 12–45)
BASOPHILS # BLD AUTO: 0.6 % (ref 0–1.8)
BASOPHILS # BLD: 0.06 K/UL (ref 0–0.12)
BILIRUB SERPL-MCNC: 1 MG/DL (ref 0.1–1.5)
BUN SERPL-MCNC: 26 MG/DL (ref 8–22)
CALCIUM SERPL-MCNC: 8.9 MG/DL (ref 8.5–10.5)
CHLORIDE SERPL-SCNC: 105 MMOL/L (ref 96–112)
CHOLEST SERPL-MCNC: 98 MG/DL (ref 100–199)
CO2 SERPL-SCNC: 26 MMOL/L (ref 20–33)
CREAT SERPL-MCNC: 1.22 MG/DL (ref 0.5–1.4)
EOSINOPHIL # BLD AUTO: 0.56 K/UL (ref 0–0.51)
EOSINOPHIL NFR BLD: 5.3 % (ref 0–6.9)
ERYTHROCYTE [DISTWIDTH] IN BLOOD BY AUTOMATED COUNT: 55.1 FL (ref 35.9–50)
FERRITIN SERPL-MCNC: 185.2 NG/ML (ref 22–322)
FOLATE SERPL-MCNC: >23.9 NG/ML
GLOBULIN SER CALC-MCNC: 3.1 G/DL (ref 1.9–3.5)
GLUCOSE SERPL-MCNC: 111 MG/DL (ref 65–99)
HCT VFR BLD AUTO: 38 % (ref 42–52)
HDLC SERPL-MCNC: 39 MG/DL
HGB BLD-MCNC: 12.6 G/DL (ref 14–18)
IMM GRANULOCYTES # BLD AUTO: 0.17 K/UL (ref 0–0.11)
IMM GRANULOCYTES NFR BLD AUTO: 1.6 % (ref 0–0.9)
IRON SATN MFR SERPL: 13 % (ref 15–55)
IRON SERPL-MCNC: 29 UG/DL (ref 50–180)
LDLC SERPL CALC-MCNC: 47 MG/DL
LYMPHOCYTES # BLD AUTO: 1.02 K/UL (ref 1–4.8)
LYMPHOCYTES NFR BLD: 9.7 % (ref 22–41)
MAGNESIUM SERPL-MCNC: 2.2 MG/DL (ref 1.5–2.5)
MCH RBC QN AUTO: 33.4 PG (ref 27–33)
MCHC RBC AUTO-ENTMCNC: 33.2 G/DL (ref 33.7–35.3)
MCV RBC AUTO: 100.8 FL (ref 81.4–97.8)
MONOCYTES # BLD AUTO: 0.79 K/UL (ref 0–0.85)
MONOCYTES NFR BLD AUTO: 7.5 % (ref 0–13.4)
NEUTROPHILS # BLD AUTO: 7.9 K/UL (ref 1.82–7.42)
NEUTROPHILS NFR BLD: 75.3 % (ref 44–72)
NRBC # BLD AUTO: 0 K/UL
NRBC BLD-RTO: 0 /100 WBC
PLATELET # BLD AUTO: 287 K/UL (ref 164–446)
PMV BLD AUTO: 10.3 FL (ref 9–12.9)
POTASSIUM SERPL-SCNC: 3.8 MMOL/L (ref 3.6–5.5)
PROT SERPL-MCNC: 6.4 G/DL (ref 6–8.2)
RBC # BLD AUTO: 3.77 M/UL (ref 4.7–6.1)
SODIUM SERPL-SCNC: 138 MMOL/L (ref 135–145)
TIBC SERPL-MCNC: 231 UG/DL (ref 250–450)
TRANSFERRIN SERPL-MCNC: 164 MG/DL (ref 200–370)
TRIGL SERPL-MCNC: 59 MG/DL (ref 0–149)
VIT B12 SERPL-MCNC: 279 PG/ML (ref 211–911)
WBC # BLD AUTO: 10.5 K/UL (ref 4.8–10.8)

## 2018-10-29 PROCEDURE — A9270 NON-COVERED ITEM OR SERVICE: HCPCS | Performed by: NURSE PRACTITIONER

## 2018-10-29 PROCEDURE — 83550 IRON BINDING TEST: CPT

## 2018-10-29 PROCEDURE — G8988 SELF CARE GOAL STATUS: HCPCS | Mod: CI

## 2018-10-29 PROCEDURE — 700102 HCHG RX REV CODE 250 W/ 637 OVERRIDE(OP): Performed by: NURSE PRACTITIONER

## 2018-10-29 PROCEDURE — 82607 VITAMIN B-12: CPT

## 2018-10-29 PROCEDURE — G8987 SELF CARE CURRENT STATUS: HCPCS | Mod: CK

## 2018-10-29 PROCEDURE — 80053 COMPREHEN METABOLIC PANEL: CPT

## 2018-10-29 PROCEDURE — 83735 ASSAY OF MAGNESIUM: CPT

## 2018-10-29 PROCEDURE — 82728 ASSAY OF FERRITIN: CPT

## 2018-10-29 PROCEDURE — 85025 COMPLETE CBC W/AUTO DIFF WBC: CPT

## 2018-10-29 PROCEDURE — 99231 SBSQ HOSP IP/OBS SF/LOW 25: CPT | Mod: GC | Performed by: INTERNAL MEDICINE

## 2018-10-29 PROCEDURE — 84466 ASSAY OF TRANSFERRIN: CPT

## 2018-10-29 PROCEDURE — 97530 THERAPEUTIC ACTIVITIES: CPT

## 2018-10-29 PROCEDURE — 82746 ASSAY OF FOLIC ACID SERUM: CPT

## 2018-10-29 PROCEDURE — 36415 COLL VENOUS BLD VENIPUNCTURE: CPT

## 2018-10-29 PROCEDURE — 83540 ASSAY OF IRON: CPT

## 2018-10-29 PROCEDURE — A9270 NON-COVERED ITEM OR SERVICE: HCPCS | Performed by: HOSPITALIST

## 2018-10-29 PROCEDURE — 80061 LIPID PANEL: CPT

## 2018-10-29 PROCEDURE — 97166 OT EVAL MOD COMPLEX 45 MIN: CPT

## 2018-10-29 PROCEDURE — 700102 HCHG RX REV CODE 250 W/ 637 OVERRIDE(OP): Performed by: HOSPITALIST

## 2018-10-29 PROCEDURE — 97116 GAIT TRAINING THERAPY: CPT

## 2018-10-29 PROCEDURE — 770001 HCHG ROOM/CARE - MED/SURG/GYN PRIV*

## 2018-10-29 RX ADMIN — POTASSIUM CHLORIDE 20 MEQ: 1500 TABLET, EXTENDED RELEASE ORAL at 04:45

## 2018-10-29 RX ADMIN — ACETAMINOPHEN 650 MG: 325 TABLET, FILM COATED ORAL at 20:40

## 2018-10-29 RX ADMIN — FUROSEMIDE 80 MG: 40 TABLET ORAL at 04:44

## 2018-10-29 RX ADMIN — VITAMIN D, TAB 1000IU (100/BT) 1000 UNITS: 25 TAB at 04:44

## 2018-10-29 RX ADMIN — TRAMADOL HYDROCHLORIDE 50 MG: 50 TABLET, FILM COATED ORAL at 00:05

## 2018-10-29 RX ADMIN — ACETAMINOPHEN 650 MG: 325 TABLET, FILM COATED ORAL at 04:44

## 2018-10-29 RX ADMIN — METOPROLOL SUCCINATE 25 MG: 25 TABLET, EXTENDED RELEASE ORAL at 04:44

## 2018-10-29 RX ADMIN — PRAVASTATIN SODIUM 10 MG: 10 TABLET ORAL at 20:42

## 2018-10-29 RX ADMIN — RIVAROXABAN 15 MG: 15 TABLET, FILM COATED ORAL at 04:44

## 2018-10-29 RX ADMIN — POTASSIUM CHLORIDE 20 MEQ: 1500 TABLET, EXTENDED RELEASE ORAL at 18:11

## 2018-10-29 ASSESSMENT — COGNITIVE AND FUNCTIONAL STATUS - GENERAL
MOBILITY SCORE: 19
PERSONAL GROOMING: A LITTLE
SUGGESTED CMS G CODE MODIFIER MOBILITY: CK
MOVING FROM LYING ON BACK TO SITTING ON SIDE OF FLAT BED: A LITTLE
DRESSING REGULAR UPPER BODY CLOTHING: A LITTLE
HELP NEEDED FOR BATHING: A LOT
SUGGESTED CMS G CODE MODIFIER DAILY ACTIVITY: CK
TURNING FROM BACK TO SIDE WHILE IN FLAT BAD: A LITTLE
CLIMB 3 TO 5 STEPS WITH RAILING: A LITTLE
MOVING TO AND FROM BED TO CHAIR: A LITTLE
DRESSING REGULAR LOWER BODY CLOTHING: A LOT
TOILETING: A LITTLE
STANDING UP FROM CHAIR USING ARMS: A LITTLE
DAILY ACTIVITIY SCORE: 17

## 2018-10-29 ASSESSMENT — ENCOUNTER SYMPTOMS
DIAPHORESIS: 0
WEAKNESS: 0
CONSTIPATION: 0
HEADACHES: 0
DIZZINESS: 0
CLAUDICATION: 0
INSOMNIA: 0
DEPRESSION: 0
CHILLS: 0
PND: 0
SPUTUM PRODUCTION: 0
SHORTNESS OF BREATH: 0
FEVER: 0
MEMORY LOSS: 1
ABDOMINAL PAIN: 0
BLOOD IN STOOL: 0
TREMORS: 0
WHEEZING: 0
COUGH: 0
VOMITING: 0
PALPITATIONS: 0
FOCAL WEAKNESS: 0
NERVOUS/ANXIOUS: 0
SPEECH CHANGE: 0
SENSORY CHANGE: 0
TINGLING: 0
HEMOPTYSIS: 0
ORTHOPNEA: 0
DIARRHEA: 0
NAUSEA: 0

## 2018-10-29 ASSESSMENT — GAIT ASSESSMENTS
DISTANCE (FEET): 90
ASSISTIVE DEVICE: FRONT WHEEL WALKER
GAIT LEVEL OF ASSIST: STAND BY ASSIST

## 2018-10-29 ASSESSMENT — ACTIVITIES OF DAILY LIVING (ADL): TOILETING: INDEPENDENT

## 2018-10-29 ASSESSMENT — PAIN SCALES - GENERAL
PAINLEVEL_OUTOF10: 0
PAINLEVEL_OUTOF10: 4

## 2018-10-29 NOTE — DISCHARGE PLANNING
Agency/Facility Name: [facility]  Spoke To: Balwinder  Outcome: This CCA was notified next available day available for transport to [place] will possibly be this Friday. CHRISTEN Sands notified via .

## 2018-10-29 NOTE — DISCHARGE PLANNING
Anticipated Discharge Disposition:   Home with HH    Action:   Asked CCA to follow up with [facility].   Nephvidya is here and his contact information is as follows:   Rao Rodriguez  Tel [phone]    Pt's PCP is APRMARLO is Kellie Prakash at the [facility] in [facility].   Notified CCA.    Nephew is concerned about taking pt home as pt will need to come back here in 2 weeks.   Nephew is frustrated he said about not knowing whats going on.   Notified MD.     Pt is on service with [facility] [employer] for home O2.   CCA will try and get a O2 tank for transport.     FWW has been ordered per APRN.   Per MD and APRN pt does not qualify for SNF at this time.      Per CCA,     Barriers to Discharge:   Pending HH acceptance    Plan:   Update MD.   Follow up with nephew.     Addendum:   Per [facility], [facility] and $75.00 for a loaner tank.   Sister Lucille agreed to pay for the oxygen. Notified CCA.  Wet signature for face to face sent to CCA.   RN aware that desat study needs to be done so APRN can write home O2 order   [facility]

## 2018-10-29 NOTE — DISCHARGE PLANNING
Agency/Facility Name: [facility]  Spoke To: Ira  Outcome: Ira confirmed 2 tanks will be delivered to pt's bedside today.

## 2018-10-29 NOTE — FACE TO FACE
Face to Face Note  -  Durable Medical Equipment    CARLOS Finch - NPI: [id number]  I certify that this patient is under my care and that they had a durable medical equipment(DME)face to face encounter by myself that meets the physician DME face-to-face encounter requirements with this patient on:    Date of encounter:   Patient:                    MRN:                       YOB: 2018  Ankur Negron  [id number]  8/13/1926     The encounter with the patient was in whole, or in part, for the following medical condition, which is the primary reason for durable medical equipment:  CHF    I certify that, based on my findings, the following durable medical equipment is medically necessary:  Oxygen.    HOME O2 Saturation Measurements:(Values must be present for Home Oxygen orders)  Room air sat at rest: 88  Room air sat with amb: 83  With liters of O2: 94, O2 sat at rest with O2: 3  With Liters of O2: 3, O2 sat with amb with O2 : 90  Is the patient mobile?: Yes    My Clinical findings support the need for the above equipment due to:  Hypoxia    Supporting Symptoms: (see above)

## 2018-10-29 NOTE — DISCHARGE PLANNING
Agency/Facility Name: [facility]  Spoke To: Pat  Outcome: Pat is requesting MD signature on F2F, information on which provider will pt follow up with, and nephews phone number. RN JOSE A Sands notified.

## 2018-10-29 NOTE — PROGRESS NOTES
Neurosurgery Progress Note    Subjective:  This 89-year-old patient suffered a ground level   fall on 10/27/2018.  He was initially evaluated in [place] at the local   facility and was then transferred to the [facility] for   further workup and care based on the finding of an odontoid fracture.  Dr. Tong Boyce, neurosurgery was consulted. Patient currently has no complaints, denies weakness, numbness or pain in extremities.    Exam:  This patient is awake, alert and oriented.  He is   in a [place] J collar.  He is neuromotor and neurosensory ntact.    BP  Min: 115/57  Max: 142/62  Pulse  Av  Min: 61  Max: 71  Resp  Av  Min: 16  Max: 18  Temp  Av.7 °C (98.1 °F)  Min: 36.5 °C (97.7 °F)  Max: 36.9 °C (98.5 °F)  SpO2  Av %  Min: 90 %  Max: 93 %    No Data Recorded    Recent Labs      10/28/18   0211  10/29/18   0129   WBC  11.0*  10.5   RBC  3.74*  3.77*   HEMOGLOBIN  12.9*  12.6*   HEMATOCRIT  37.5*  38.0*   MCV  100.3*  100.8*   MCH  34.5*  33.4*   MCHC  34.4  33.2*   RDW  54.2*  55.1*   PLATELETCT  277  287   MPV  10.2  10.3     Recent Labs      10/28/18   0211  10/29/18   0130   SODIUM  140  138   POTASSIUM  3.8  3.8   CHLORIDE  105  105   CO2  28  26   GLUCOSE  111*  111*   BUN  23*  26*   CREATININE  1.11  1.22   CALCIUM  9.1  8.9               Intake/Output       10/28/18 0700 - 10/29/18 0659 10/29/18 0700 - 10/30/18 0659      1900-0659 Total  8482-8025 Total       Intake    P.O.  480  280 760  --  -- --    P.O. 480 280 760 -- -- --    Total Intake 480 280 760 -- -- --       Output    Urine  --  -- --  --  -- --    Number of Times Voided 1 x 3 x 4 x -- -- --    Total Output -- -- -- -- -- --       Net I/O     480 280 760 -- -- --            Intake/Output Summary (Last 24 hours) at 10/29/18 0812  Last data filed at 10/28/18 1900   Gross per 24 hour   Intake              760 ml   Output                0 ml   Net              760 ml            • Influenza  Vaccine High-Dose pf  0.5 mL Once PRN   • pneumococcal 13-Tameka Conj Vacc  0.5 mL Once PRN   • vitamin D  1,000 Units DAILY   • furosemide  80 mg QAM   • rivaroxaban  15 mg QAM   • pravastatin  10 mg Nightly   • potassium chloride SA  20 mEq BID   • metoprolol SR  25 mg DAILY   • senna-docusate  2 Tab BID    And   • polyethylene glycol/lytes  1 Packet QDAY PRN    And   • magnesium hydroxide  30 mL QDAY PRN    And   • bisacodyl  10 mg QDAY PRN   • Respiratory Care per Protocol   Continuous RT   • guaiFENesin dextromethorphan  10 mL Q6HRS PRN   • acetaminophen  650 mg Q6HRS PRN   • tramadol  50 mg Q6HRS PRN       Assessment and Plan:  Hospital day #3  At this point, no neurosurgical fixation of the odontoid fracture is   Recommended by Dr. Boyce. Continue with 24/7 strict bracing.  Based on the patient's age and the nature and location of the fracture, bracing may require 3-6 months before   healing.  A bone growth stimulator may be considered on an outpatient basis. Patient will follow up in the clinic as an outpatient in 2 weeks with AP and lat cervical spine x-rays in the brace. He agrees with the plan. Neurosurgery will sign off.     D/w Dr. Boyce

## 2018-10-29 NOTE — DISCHARGE PLANNING
Agency/Facility Name: [facility]  Outcome: DME Referral sent for 02. 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 [employer] can providea  [employer] tank for $75.00. Request has been made on referral for [employer] to call pt's sister Lucille for payment.

## 2018-10-29 NOTE — THERAPY
"Occupational Therapy Evaluation completed.   Functional Status: Pt seated upon arrival. Max A for LB dressing. Pt completed sit>stand with CGA with FWW, and ambulated with CGA to sink. Went over packet and began education and  for pt and nephew on don/doff/wear/care of c-collar. Could use reinforcement. Pt completed brushing dentures and oral care with SPV and mod v/cs for maintaining precautions and using adaptive techniques. Pt demo'd decreased AROM and coordination while using toothpaste. Completed wiping face with SPV. Pt reported fatigue, so ambulated back to chair, req min v/cs for FWW safety. Pt CGA for stand>sit. Left seated in chair.  Plan of Care: Will benefit from Occupational Therapy 4 times per week  Discharge Recommendations:  Equipment: Sock aid, and Will Continue to Assess for Equipment Needs. Post-acute therapy: Recommend inpatient transitional care prior to D/C; however regardless of d/c location, pt will req 24/7 SPV for safety and adherence to precautions.     See \"Rehab Therapy-Acute\" Patient Summary Report for complete documentation.      Pt is a 89 yo male admitted after GLF and non-op odontoid fx w/ 24/7 c-collar. PMHx of Afib, ischemic heart disease, chronic renal impairment, CHF, endocarditis, and dysphagia. Pt currently req mod v/cs to maintain precautions, and limited knowledge/demo of adaptive techniques. Pt currently limited by decreased activity tolerance, decreased functional ambulation, decreased balance, limited adherence to precautions, decreased coordination, decreased AROM in B hands, decreased flexibility for LB dressing, and decreased cognition which are affecting pt's ability to complete ADLs/IADLs I'ly. However, it is unclear what is pt's baseline. Recommend acute OT, as well as inpatient transitional care prior to d/c home with nephew for assist. However regardless of d/c location, pt will req 24/7 SPV for safety and adherence to precautions.     "

## 2018-10-29 NOTE — CARE PLAN
Problem: Communication  Goal: The ability to communicate needs accurately and effectively will improve  Outcome: PROGRESSING AS EXPECTED      Problem: Safety  Goal: Will remain free from falls  Outcome: PROGRESSING AS EXPECTED      Problem: Venous Thromboembolism (VTW)/Deep Vein Thrombosis (DVT) Prevention:  Goal: Patient will participate in Venous Thrombosis (VTE)/Deep Vein Thrombosis (DVT)Prevention Measures  Outcome: PROGRESSING AS EXPECTED

## 2018-10-29 NOTE — DISCHARGE PLANNING
Anticipated Discharge Disposition:   Home with homehealth    Action:   [facility] accepted.   [employer] to deliver O2 tanks sometime this afternoon.   CCA aware that pt will need 2 tanks for the 5 hr trip to [facility].   Nephew aware of the discharge tomorrow at around 11 am.   Dr. Langley and HO aware.     Barriers to Discharge:   O2 tank needs to be delivered    Plan:   Dc tomorrow.

## 2018-10-29 NOTE — THERAPY
"Physical Therapy Treatment completed.   Bed Mobility:  Supine to Sit: Stand by Assist  Transfers: Sit to Stand: Contact Guard Assist (from low chair, cues)  Gait: Level Of Assist: Stand by Assist with Front-Wheel Walker x90 ft      Plan of Care: Will benefit from Physical Therapy 4 times per week  Discharge Recommendations: Equipment: Front-Wheel Walker (patient's new FWW in room). Post-acute therapy: pending progress, will require 24 hr SPV either home with nephew or post acute services.     Patient seen today for f/u PT tx. Patient required cues to maintain spinal precautions during bed mobility. Gait is slow with incr BRIANNA hip flex with use of FWW. Gait distance limited by SOB with ambulation. Nsg present and aware. Will continue to follow while in acute care for re-edu RE: spinal precautions, bed mobility and gait with FWW. D/c pending progress, will require 24 hr SPV either at home with nephew or post acute services.     See \"Rehab Therapy-Acute\" Patient Summary Report for complete documentation.       "

## 2018-10-29 NOTE — DISCHARGE PLANNING
Agency/Facility Name: [facility]  Outcome: MD signature on F2F, information on which provider will pt follow up with, and nephews phone number faxed as requested.

## 2018-10-29 NOTE — DISCHARGE PLANNING
Agency/Facility Name: [facility]  Spoke To: Alexia @ [employer] and Jessica @ [facility]  Outcome: [facility] to deliver loaner tank within the next 2 hours. [facility] will not be requiring payment since they have an available [employer] tank in stock.     Alexia at [employer] has approved tank to be loaned to pt from [facility].

## 2018-10-29 NOTE — DISCHARGE PLANNING
Agency/Facility Name: [facility]  Outcome: Verified all necessary information has been received, HH Referral has been accepted.

## 2018-10-30 VITALS
HEIGHT: 78 IN | WEIGHT: 269.18 LBS | TEMPERATURE: 97.8 F | BODY MASS INDEX: 31.14 KG/M2 | DIASTOLIC BLOOD PRESSURE: 66 MMHG | SYSTOLIC BLOOD PRESSURE: 120 MMHG | OXYGEN SATURATION: 94 % | RESPIRATION RATE: 17 BRPM | HEART RATE: 64 BPM

## 2018-10-30 PROCEDURE — 700102 HCHG RX REV CODE 250 W/ 637 OVERRIDE(OP): Performed by: HOSPITALIST

## 2018-10-30 PROCEDURE — 97530 THERAPEUTIC ACTIVITIES: CPT

## 2018-10-30 PROCEDURE — 97535 SELF CARE MNGMENT TRAINING: CPT

## 2018-10-30 PROCEDURE — A9270 NON-COVERED ITEM OR SERVICE: HCPCS | Performed by: HOSPITALIST

## 2018-10-30 PROCEDURE — A9270 NON-COVERED ITEM OR SERVICE: HCPCS | Performed by: NURSE PRACTITIONER

## 2018-10-30 PROCEDURE — 99239 HOSP IP/OBS DSCHRG MGMT >30: CPT | Performed by: INTERNAL MEDICINE

## 2018-10-30 PROCEDURE — 700102 HCHG RX REV CODE 250 W/ 637 OVERRIDE(OP): Performed by: NURSE PRACTITIONER

## 2018-10-30 RX ORDER — ACETAMINOPHEN 500 MG
500-1000 TABLET ORAL EVERY 6 HOURS PRN
Qty: 30 TAB | Refills: 0 | Status: SHIPPED | OUTPATIENT
Start: 2018-10-30

## 2018-10-30 RX ADMIN — TRAMADOL HYDROCHLORIDE 50 MG: 50 TABLET, FILM COATED ORAL at 04:37

## 2018-10-30 RX ADMIN — METOPROLOL SUCCINATE 25 MG: 25 TABLET, EXTENDED RELEASE ORAL at 04:37

## 2018-10-30 RX ADMIN — VITAMIN D, TAB 1000IU (100/BT) 1000 UNITS: 25 TAB at 04:37

## 2018-10-30 RX ADMIN — FUROSEMIDE 80 MG: 40 TABLET ORAL at 04:37

## 2018-10-30 RX ADMIN — POTASSIUM CHLORIDE 20 MEQ: 1500 TABLET, EXTENDED RELEASE ORAL at 04:37

## 2018-10-30 RX ADMIN — RIVAROXABAN 15 MG: 15 TABLET, FILM COATED ORAL at 04:39

## 2018-10-30 ASSESSMENT — PAIN SCALES - GENERAL
PAINLEVEL_OUTOF10: 0
PAINLEVEL_OUTOF10: 3
PAINLEVEL_OUTOF10: 0

## 2018-10-30 ASSESSMENT — COGNITIVE AND FUNCTIONAL STATUS - GENERAL
DAILY ACTIVITIY SCORE: 18
PERSONAL GROOMING: A LITTLE
SUGGESTED CMS G CODE MODIFIER DAILY ACTIVITY: CK
DRESSING REGULAR LOWER BODY CLOTHING: A LITTLE
HELP NEEDED FOR BATHING: A LOT
TOILETING: A LOT

## 2018-10-30 NOTE — PROGRESS NOTES
[facility] Hospitalist Progress Note    Date of Service: 10/29/2018    Chief Complaint  89 y.o. male transferred from [place] 10/27/2018 with a left forehead abrasion & neck stiffness following a forward fall while bending over to pull his pants up. He was found to have a stable type 2 odontoid fracture. Neurosurgery was consulted and recommended nonsurgical intervention with c-collar x 3-6 months & repeat imaging in 2 weeks..    Interval Problem Update  Feels good. Has had no pain. No sensorimotor deficits. Labs stable.  Afebrile overnight, other VSS, currently on room air but wears 2 L of oxygen via nasal cannula at baseline.  No evidence of acute heart failure exacerbation.  Lungs are clear.  Lower extremity edema at baseline.  No shortness of breath.    Consultants/Specialty  Neurosurgery-Dr. Boyce    Disposition  Home with  tomorrow.  Needs cervical x-ray in 2 weeks with neurosurgical follow-up.  Front wheel walker provided.  Oxygen tanks should be at bedside this afternoon.  Patient and family requesting briefs for transport home, which the patient uses at home but does not currently have any of his with him.      Review of Systems   Constitutional: Negative for chills, diaphoresis, fever and malaise/fatigue.   HENT: Positive for hearing loss.    Respiratory: Negative for cough, hemoptysis, sputum production, shortness of breath and wheezing.    Cardiovascular: Positive for leg swelling (chronic; unchanged from his usual amount). Negative for chest pain, palpitations, orthopnea, claudication and PND.   Gastrointestinal: Negative for abdominal pain, blood in stool, constipation, diarrhea, melena, nausea and vomiting.   Genitourinary: Negative for dysuria, frequency, hematuria and urgency.   Musculoskeletal:        Denies pain   Neurological: Negative for dizziness, tingling, tremors, sensory change, speech change, focal weakness, weakness and headaches.   Psychiatric/Behavioral: Positive for memory loss (Chronic,  mild). Negative for depression. The patient is not nervous/anxious and does not have insomnia.    All other systems reviewed and are negative.     Physical Exam  Laboratory/Imaging   Hemodynamics  Temp (24hrs), Av.6 °C (97.8 °F), Min:36.2 °C (97.1 °F), Max:36.9 °C (98.5 °F)   Temperature: 36.6 °C (97.9 °F)  Pulse  Av.2  Min: 54  Max: 102    Blood Pressure : 100/54      Respiratory  Respiration: 18, Pulse Oximetry: 94 %  Work Of Breathing / Effort: Mild  RUL Breath Sounds: Clear, RML Breath Sounds: Clear, RLL Breath Sounds: Crackles, JEZ Breath Sounds: Clear, LLL Breath Sounds: Crackles    Fluids    Intake/Output Summary (Last 24 hours) at 10/29/18 1806  Last data filed at 10/28/18 1900   Gross per 24 hour   Intake              280 ml   Output                0 ml   Net              280 ml     Nutrition  Orders Placed This Encounter   Procedures   • Diet Order Cardiac     Standing Status:   Standing     Number of Occurrences:   1     Order Specific Question:   Diet:     Answer:   Cardiac [6]     Order Specific Question:   Consistency/Fluid modifications:     Answer:   Nectar Thick [2]     Physical Exam   Constitutional: He is oriented to person, place, and time. He appears well-developed and well-nourished. He is active and cooperative. He appears ill (chronically ill-appearing). No distress.   Currently on room air   HENT:   Head: Normocephalic and atraumatic.   Right Ear: Decreased hearing is noted.   Left Ear: Decreased hearing is noted.   Mouth/Throat: Abnormal dentition.   Eyes: Pupils are equal, round, and reactive to light. EOM are normal. Right eye exhibits no discharge. Left eye exhibits no discharge. No scleral icterus.   Neck: No JVD present.   Favors head tilting to his right side  Cervical collar in place   Cardiovascular: Normal rate, normal heart sounds and intact distal pulses.  An irregularly irregular rhythm present. Exam reveals no gallop and no friction rub.    No murmur  heard.  Pulmonary/Chest: Effort normal. No accessory muscle usage or stridor. No tachypnea. No respiratory distress. He has decreased breath sounds in the right lower field. He has no wheezes. He has no rhonchi. He has no rales.   Abdominal: Soft. Bowel sounds are normal. He exhibits no distension. There is no tenderness. There is no rebound and no guarding.   Centrally obese   Musculoskeletal: Normal range of motion. He exhibits edema (BLE, 1+ pitting).   Neurological: He is alert and oriented to person, place, and time. He has normal strength. No cranial nerve deficit or sensory deficit. Gait (Gait instability at baseline) abnormal. GCS eye subscore is 4. GCS verbal subscore is 5. GCS motor subscore is 6.   Skin: Skin is warm and dry. Bruising (scattered) noted. No rash noted. He is not diaphoretic. No erythema. No pallor.        Psychiatric: He has a normal mood and affect. His speech is normal and behavior is normal. Thought content normal. Cognition and memory are impaired. He expresses impulsivity.   Very pleasant  Seems to get easily angered with his nephew   Nursing note and vitals reviewed.      Recent Labs      10/28/18   0211  10/29/18   0129   WBC  11.0*  10.5   RBC  3.74*  3.77*   HEMOGLOBIN  12.9*  12.6*   HEMATOCRIT  37.5*  38.0*   MCV  100.3*  100.8*   MCH  34.5*  33.4*   MCHC  34.4  33.2*   RDW  54.2*  55.1*   PLATELETCT  277  287   MPV  10.2  10.3     Recent Labs      10/28/18   0211  10/29/18   0130   SODIUM  140  138   POTASSIUM  3.8  3.8   CHLORIDE  105  105   CO2  28  26   GLUCOSE  111*  111*   BUN  23*  26*   CREATININE  1.11  1.22   CALCIUM  9.1  8.9             Recent Labs      10/29/18   0130   TRIGLYCERIDE  59   HDL  39*   LDL  47          Assessment/Plan     * Dens fracture, type 2- (present on admission)   Assessment & Plan    Neurosurgery recommending nonoperative intervention. Continue c-collar, anticipated need of 3-6 months.  Wants the patient to have a follow-up x-ray and office  visit with  in 2 weeks.  Pain meds available if needed, but has not had any pain.  No evidence of neuro impairment.  Continue to encourage compliance.  Risks of not complying with c-collar have been extensively explained to the patient.        Dysphagia, chronic- (present on admission)   Assessment & Plan    Was on honey thick liquids prior to admission; continued here. No evidence of aspiration.        Medically noncompliant- (present on admission)   Assessment & Plan    Chronic, intermittent.  Is known to be quite stubborn according to his family.  Education done on the importance of compliance, especially with the c-collar.        Vitamin D deficiency- (present on admission)   Assessment & Plan    Continue home supplementation regimen.        Anemia of chronic disease- (present on admission)   Assessment & Plan    Hgb stable at 12.6.        Chronic renal impairment- (present on admission)   Assessment & Plan    Renal function stable today.  Reportedly on reduced dose of xarelto due to this.        Chronic respiratory failure with hypoxia (HCC)- (present on admission)   Assessment & Plan    No acute exacerbation.  On 2 LPM of O2 at home.  On room air at time of exam, but is known to not wear his oxygen when he is supposed to.  Home oxygen evaluation was done and was abnormal.  Oxygen delivery for anticipated transfer home tomorrow has been requested.  RT protocol ordered.        CHF (congestive heart failure) (HCC)- (present on admission)   Assessment & Plan    Appears euvolemic.  No evidence of exacerbation.  Unknown if diastolic, systolic, or combined.   Continue home lasix & monitor closely.        Chronic atrial fibrillation (HCC)- (present on admission)   Assessment & Plan    Xarelto has been restarted, though this should be readdressed in the outpatient setting due to the patient falling at home.  He does have a very high [id number] score, which could be the reason he has been kept on it in the  outpatient setting.  We will defer to his primary care physician for further management.  HR controlled.  Continue home dose toprol.        HLD (hyperlipidemia)- (present on admission)   Assessment & Plan    Lipid panel within normal limits.  Continue home pravastatin.          Quality-Core Measures   Reviewed items::  Labs reviewed and Medications reviewed  Joe catheter::  No Joe  DVT: xarelto.  DVT prophylaxis - mechanical:  SCDs  Ulcer Prophylaxis::  No  Assessed for rehabilitation services:  Patient was assess for and/or received rehabilitation services during this hospitalization

## 2018-10-30 NOTE — THERAPY
"Occupational Therapy Treatment completed with focus on ADLs and caregiver training.  Functional Status: Pt seated in chair on arrival with CNA removing IV. Pt completed don/doff of socks with SPV using 4 point technique. Continued caregiver training with nephew and pt on proper don/doff/wear/care of c-collar; demo'd and verbalized good understanding. Pt donned donated pants with min v/cs and SPV, however upon standing, had a bowel movement; reported he didn't feel it coming. Pt req 10 min of standing with SBA with FWW during cleaning. Pt began to urinate while standing and was unable to stop. Floor, socks, and leg dressings became saturated and unsafe for ambulation. Pt stand>sit with SBA, and donned shirt with set-up SPV. Pt left with CNA and Nsg in room changing leg dressings.   Plan of Care: Will benefit from Occupational Therapy 4 times per week  Discharge Recommendations:  Equipment Front-Wheel Walker and Will Continue to Assess for Equipment Needs. Post-acute therapy: Recommend outpatient transitional therapy, and will require 24/7 SPV for safety and adhering to precautions    See \"Rehab Therapy-Acute\" Patient Summary Report for complete documentation.     Pt seen for OT session. Pt completed don/doff socks with SPV, however had BM while pulling pants up and req clean-up. Pt demo'd increased activity tolerance, as stood for 10 min during clean up with FWW w/min v/cs for safety. Pt donned shirt with SPV while seated. Pt unable to verbalize or maintain precautions during session. Nephew and pt re-educated on c-collar don/doff/wear/care, precautions, and AE for safety. Continue acute OT, and recommend outpatient transitional care, as well as 24/7 SPV.  "

## 2018-10-30 NOTE — DISCHARGE PLANNING
Anticipated Discharge Disposition:   Home with [place]     Action:   Received a call from RN and Hospitalist. MD was questioning dispo. Explained to MD that Dr. Langley and HO Jones already talked to nephew yesterday and it was determined that nephew is staying with pt 24/7. He has been the main caregiver. Cm also mentioned to hospitalist that CM asked for SNF order yesterday but Dr. Langley did not feel that pt would benefit from it as pt is at his baseline and going to an SNF would not change pt's behavior with regards to taking care of himself. O2 tank has already been delivered to the bedside.     Barriers to Discharge:   none    Plan:   Dc home today.     Addendum:  Rounding done. Nephew is at the bedside to  pt. Confirmed that [employer] delivered 2 portable tanks for pt.

## 2018-10-30 NOTE — HEART FAILURE PROGRAM
Cardiovascular Nurse Navigator () Advanced Heart Failure Program Inpatient Progress Note:    Dens fracture. Noted to have CHF unknown what kind but not acutely decompensated. No IV diuresis has been given.     Of note, there are no echocardiograms or cardiology encounters on file.    Since there is no acute decompensation noted a 7 calendar day HF f/u not indicated.    Thank you and please call JOSIAH Hand with questions M-F

## 2018-10-30 NOTE — PROGRESS NOTES
Pt's nephew addressed concern to RN and the MD that it's difficult for them to make the trip from [place] to [place] so they are wondering if they can do all the required follow up imaging in [place] and send the results to [facility] office in [place].    RN talked to RANDY Sands. RANDY Sands will ask LUIS Sylvester and will notify this RN.    Per RANDY Sands, ok to dc pt now. RANDY will call pt's nephew once she gets an answer from LUIS Sylvester.

## 2018-10-30 NOTE — PROGRESS NOTES
Assumed care of pt. Pt. A&Ox4. Denies numbness, tingling, and pain. Sitting at chair. Call light with reach. Chair alarm in use. Pt educated to call for assistance.

## 2018-10-30 NOTE — CARE PLAN
Problem: Safety  Goal: Will remain free from falls  Outcome: PROGRESSING AS EXPECTED      Problem: Infection  Goal: Will remain free from infection  Outcome: PROGRESSING AS EXPECTED      Problem: Bowel/Gastric:  Goal: Normal bowel function is maintained or improved  Outcome: PROGRESSING SLOWER THAN EXPECTED

## 2018-10-30 NOTE — DISCHARGE SUMMARY
Discharge Summary    CHIEF COMPLAINT ON ADMISSION  Chief Complaint   Patient presents with   • T-5000 GLF     Reason for Admission  Neck stiffness following fall    Admission Date  10/27/2018    CODE STATUS  Full Code    HPI & HOSPITAL COURSE  This is a 89 y.o. male here from [place] transferred here from their hospital with a left forehead abrasion & neck stiffness following a forward fall while trying to pull up his pants. Outside imaging showed a stable type 2 odontoid fracture and neurosurgery was consulted, who recommended non-operative management and wearing a cervical collar x 3-6 months. Per their request, the patient has been asked to follow up with them in 2 weeks for an xray to ensure the fracture remains stable.     The patient's nephew is currently the primary caregiver for the patient, and has been for the last 29 months. He seems overwhelmed by the patient's stubbornness and unwillingness to do what he is supposed to do.  The patient's sister, who is his POA but lives in [place], reaffirmed that the patient is stubborn and is working on getting respite care for his nephew. The patient, at baseline, is reported to have ongoing issues with memory impairment, unstable gait, and noncompliance with medical treatment. The importance of compliance and risks of not doing so were extensively discussed with the patient, who verbalized understanding. He has a high [id number] score of 5, he will be continued on his low dose xarelto, but will need to discuss risks and benefits with his PCP given his recent fall.    A walker has been ordered so he can safely transfer and ambulate on the ride home.  Depends/briefs have also been requested by the family and will be provided (the patient uses them at home but doesn't have any with him). Oxygen tanks have also been provided to them for their long ride home.    On the day of discharge he does not have any active complaints and I discussed with him at length that he needs  to follow up with primary care provider and neurosurgery for follow up. He agreed with plan. I also discussed regarding his discharge with  and RN at the bedside.     Therefore, he is discharged in guarded and stable condition to home with organized home healthcare and close outpatient follow-up.    The patient met 2-midnight criteria for an inpatient stay at the time of discharge.    Discharge Date  10/30/2018    FOLLOW UP ITEMS POST DISCHARGE  PCP in [place]  Neurosurgery    DISCHARGE DIAGNOSES  Principal Problem:    Dens fracture, type 2 POA: Yes  Active Problems:    Dysphagia, chronic POA: Yes    Leukocytosis POA: Yes    HLD (hyperlipidemia) POA: Yes    Chronic atrial fibrillation (HCC) POA: Yes    CHF (congestive heart failure) (HCC) POA: Yes    Chronic respiratory failure with hypoxia (HCC) POA: Yes    Chronic renal impairment POA: Yes    Anemia of chronic disease POA: Yes    Vitamin D deficiency POA: Unknown  Resolved Problems:    * No resolved hospital problems. *    FOLLOW UP  He needs to follow up with primary care provider and Neurosurgery.  No follow-up provider specified.    MEDICATIONS ON DISCHARGE        Allergies  No Known Allergies    DIET  Orders Placed This Encounter   Procedures   • Diet Order Cardiac     Standing Status:   Standing     Number of Occurrences:   1     Order Specific Question:   Diet:     Answer:   Cardiac [6]     Order Specific Question:   Consistency/Fluid modifications:     Answer:   Nectar Thick [2]     ACTIVITY  As tolerated.  Exercise encouraged.  Weight bearing as tolerated    CONSULTATIONS  Neurosurgery-Dr. Boyce    PROCEDURES  None    LABORATORY  Lab Results   Component Value Date    SODIUM 138 10/29/2018    POTASSIUM 3.8 10/29/2018    CHLORIDE 105 10/29/2018    CO2 26 10/29/2018    GLUCOSE 111 (H) 10/29/2018    BUN 26 (H) 10/29/2018    CREATININE 1.22 10/29/2018      Lab Results   Component Value Date    WBC 10.5 10/29/2018    HEMOGLOBIN 12.6 (L) 10/29/2018     HEMATOCRIT 38.0 (L) 10/29/2018    PLATELETCT 287 10/29/2018      Total time of the discharge process exceeds 38 minutes.

## 2018-10-30 NOTE — ASSESSMENT & PLAN NOTE
Chronic, intermittent.  Is known to be quite stubborn according to his family.  Education done on the importance of compliance, especially with the c-collar.

## 2018-10-30 NOTE — DISCHARGE INSTRUCTIONS
Follow up with primary care provider in 1 week.  Follow up with neurosurgery clinic as an outpatient in 2 weeks with AP and lat cervical spine x-rays in the brace.  Continue with 24/7 strict bracing.      Fall Prevention in the Home  Introduction  Falls can cause injuries. They can happen to people of all ages. There are many things you can do to make your home safe and to help prevent falls.  What can I do on the outside of my home?  · Regularly fix the edges of walkways and driveways and fix any cracks.  · Remove anything that might make you trip as you walk through a door, such as a raised step or threshold.  · Trim any bushes or trees on the path to your home.  · Use bright outdoor lighting.  · Clear any walking paths of anything that might make someone trip, such as rocks or tools.  · Regularly check to see if handrails are loose or broken. Make sure that both sides of any steps have handrails.  · Any raised decks and porches should have guardrails on the edges.  · Have any leaves, snow, or ice cleared regularly.  · Use sand or salt on walking paths during winter.  · Clean up any spills in your garage right away. This includes oil or grease spills.  What can I do in the bathroom?  · Use night lights.  · Install grab bars by the toilet and in the tub and shower. Do not use towel bars as grab bars.  · Use non-skid mats or decals in the tub or shower.  · If you need to sit down in the shower, use a plastic, non-slip stool.  · Keep the floor dry. Clean up any water that spills on the floor as soon as it happens.  · Remove soap buildup in the tub or shower regularly.  · Attach bath mats securely with double-sided non-slip rug tape.  · Do not have throw rugs and other things on the floor that can make you trip.  What can I do in the bedroom?  · Use night lights.  · Make sure that you have a light by your bed that is easy to reach.  · Do not use any sheets or blankets that are too big for your bed. They should not  hang down onto the floor.  · Have a firm chair that has side arms. You can use this for support while you get dressed.  · Do not have throw rugs and other things on the floor that can make you trip.  What can I do in the kitchen?  · Clean up any spills right away.  · Avoid walking on wet floors.  · Keep items that you use a lot in easy-to-reach places.  · If you need to reach something above you, use a strong step stool that has a grab bar.  · Keep electrical cords out of the way.  · Do not use floor polish or wax that makes floors slippery. If you must use wax, use non-skid floor wax.  · Do not have throw rugs and other things on the floor that can make you trip.  What can I do with my stairs?  · Do not leave any items on the stairs.  · Make sure that there are handrails on both sides of the stairs and use them. Fix handrails that are broken or loose. Make sure that handrails are as long as the stairways.  · Check any carpeting to make sure that it is firmly attached to the stairs. Fix any carpet that is loose or worn.  · Avoid having throw rugs at the top or bottom of the stairs. If you do have throw rugs, attach them to the floor with carpet tape.  · Make sure that you have a light switch at the top of the stairs and the bottom of the stairs. If you do not have them, ask someone to add them for you.  What else can I do to help prevent falls?  · Wear shoes that:  ¨ Do not have high heels.  ¨ Have rubber bottoms.  ¨ Are comfortable and fit you well.  ¨ Are closed at the toe. Do not wear sandals.  · If you use a stepladder:  ¨ Make sure that it is fully opened. Do not climb a closed stepladder.  ¨ Make sure that both sides of the stepladder are locked into place.  ¨ Ask someone to hold it for you, if possible.  · Clearly andrew and make sure that you can see:  ¨ Any grab bars or handrails.  ¨ First and last steps.  ¨ Where the edge of each step is.  · Use tools that help you move around (mobility aids) if they are  needed. These include:  ¨ Canes.  ¨ Walkers.  ¨ Scooters.  ¨ Crutches.  · Turn on the lights when you go into a dark area. Replace any light bulbs as soon as they burn out.  · Set up your furniture so you have a clear path. Avoid moving your furniture around.  · If any of your floors are uneven, fix them.  · If there are any pets around you, be aware of where they are.  · Review your medicines with your doctor. Some medicines can make you feel dizzy. This can increase your chance of falling.  Ask your doctor what other things that you can do to help prevent falls.  This information is not intended to replace advice given to you by your health care provider. Make sure you discuss any questions you have with your health care provider.  Document Released: 10/14/2010 Document Revised: 05/25/2017 Document Reviewed: 01/22/2016  © 2017 [employer]    Discharge Instructions    Discharged to home by car with relative. Discharged via wheelchair, hospital escort: Yes.  Special equipment needed: Oxygen, Cervical brace    Be sure to schedule a follow-up appointment with your primary care doctor or any specialists as instructed.     Discharge Plan:   Pneumococcal Vaccine Administered/Refused: Not given - Patient refused pneumococcal vaccine  Influenza Vaccine Indication: Patient Refuses    I understand that a diet low in cholesterol, fat, and sodium is recommended for good health. Unless I have been given specific instructions below for another diet, I accept this instruction as my diet prescription.   Other diet: Cardiac    Special Instructions: None    · Is patient discharged on Warfarin / Coumadin?   No         Depression / Suicide Risk    As you are discharged from this [facility] facility, it is important to learn how to keep safe from harming yourself.    Recognize the warning signs:  · Abrupt changes in personality, positive or negative- including increase in energy   · Giving away possessions  · Change in eating patterns-  significant weight changes-  positive or negative  · Change in sleeping patterns- unable to sleep or sleeping all the time   · Unwillingness or inability to communicate  · Depression  · Unusual sadness, discouragement and loneliness  · Talk of wanting to die  · Neglect of personal appearance   · Rebelliousness- reckless behavior  · Withdrawal from people/activities they love  · Confusion- inability to concentrate     If you or a loved one observes any of these behaviors or has concerns about self-harm, here's what you can do:  · Talk about it- your feelings and reasons for harming yourself  · Remove any means that you might use to hurt yourself (examples: pills, rope, extension cords, firearm)  · Get professional help from the community (Mental Health, Substance Abuse, psychological counseling)  · Do not be alone:Call your Safe Contact- someone whom you trust who will be there for you.  · Call your local CRISIS HOTLINE 443-5415 or 295-007-9040  · Call your local Children's Mobile Crisis Response Team [place] (596) 436-7738 or www.iPosition  · Call the toll free National Suicide Prevention Hotlines   · National Suicide Prevention Lifeline 953-155-RDZE (1051)  · National Hope Line Network 800-SUICIDE (968-9150)

## 2018-10-30 NOTE — PROGRESS NOTES
Neurosurgery Progress Note    Subjective:  No pain. No additional complaints.     Exam:  This patient is awake, alert and oriented.  He is   in a Ira J collar.  He is neuromotor and neurosensory intact.    BP  Min: 100/54  Max: 131/61  Pulse  Av.5  Min: 62  Max: 64  Resp  Av.8  Min: 17  Max: 18  Temp  Av.3 °C (97.3 °F)  Min: 35.8 °C (96.5 °F)  Max: 36.6 °C (97.9 °F)  SpO2  Av %  Min: 94 %  Max: 96 %    No Data Recorded    Recent Labs      10/28/18   0211  10/29/18   0129   WBC  11.0*  10.5   RBC  3.74*  3.77*   HEMOGLOBIN  12.9*  12.6*   HEMATOCRIT  37.5*  38.0*   MCV  100.3*  100.8*   MCH  34.5*  33.4*   MCHC  34.4  33.2*   RDW  54.2*  55.1*   PLATELETCT  277  287   MPV  10.2  10.3     Recent Labs      10/28/18   0211  10/29/18   0130   SODIUM  140  138   POTASSIUM  3.8  3.8   CHLORIDE  105  105   CO2  28  26   GLUCOSE  111*  111*   BUN  23*  26*   CREATININE  1.11  1.22   CALCIUM  9.1  8.9               Intake/Output       10/29/18 0700 - 10/30/18 0659 10/30/18 0700 - 10/31/18 0659      [id number] 5848-9743 Total 7923-4484 7014-4856 Total       Intake    Total Intake -- -- -- -- -- --       Output    Urine  --  -- --  --  -- --    Number of Times Voided 2 x -- 2 x -- -- --    Total Output -- -- -- -- -- --       Net I/O     -- -- -- -- -- --          No intake or output data in the 24 hours ending 10/30/18 0841         • Influenza Vaccine High-Dose pf  0.5 mL Once PRN   • pneumococcal 13-Tameka Conj Vacc  0.5 mL Once PRN   • vitamin D  1,000 Units DAILY   • furosemide  80 mg QAM   • rivaroxaban  15 mg QAM   • pravastatin  10 mg Nightly   • potassium chloride SA  20 mEq BID   • metoprolol SR  25 mg DAILY   • senna-docusate  2 Tab BID    And   • polyethylene glycol/lytes  1 Packet QDAY PRN    And   • magnesium hydroxide  30 mL QDAY PRN    And   • bisacodyl  10 mg QDAY PRN   • Respiratory Care per Protocol   Continuous RT   • guaiFENesin dextromethorphan  10 mL Q6HRS PRN   • acetaminophen  650 mg  Q6HRS PRN   • tramadol  50 mg Q6HRS PRN       Assessment and Plan:  Hospital day #4  Prophylactic anticoagulation: Recommended at the discresion of hospitalist team.   Neuro exam intact.  At this point, no neurosurgical fixation of the odontoid fracture is   recommended by Dr. Boyce. Continue with 24/7 strict bracing.  Based on the patient's age and the nature and location of the fracture, bracing may require 3-6 months before   healing.  A bone growth stimulator may be considered on an outpatient basis. Patient needs to follow up in the clinic as an outpatient in 2 weeks with AP and lat cervical spine x-rays in the brace. He agrees with the plan. Neurosurgery will sign off.  D/w Dr. Boyce

## 2018-11-01 NOTE — DISCHARGE PLANNING
note:  Talked Nga TEAGUE about the nephew requesting if the pt could just do the imaging in [place] and then if there is something to follow up on in the office then nephew would drive pt back to [place].   Nga was agreeable. She said that her office will call the pt and nephew for instructions regarding the follow up.

## 2019-04-15 NOTE — DIETARY
COLONOSCOPY  Progress Note    Leonid Jones  4/15/2019    Pre-op Diagnosis:   Screen for colon cancer [Z12.11]  FH: colon cancer [Z80.0]       Post-Op Diagnosis Codes:     * Screen for colon cancer [Z12.11]     * FH: colon cancer [Z80.0]     * Colon polyps [K63.5]     * Internal hemorrhoids without complication [K64.8]    Procedure/CPT® Codes:      Procedure(s):  COLONOSCOPY TO CECUM/TI WITH POLYPECTOMY ( COLD SNARE)    Surgeon(s):  Lino Kahn MD    Anesthesia: Monitor Anesthesia Care    Staff:   Endo Technician: Nadege Ridley  Endo Nurse: Alicia Villa RN    Estimated Blood Loss: minimal    Urine Voided: * No values recorded between 4/15/2019  8:56 AM and 4/15/2019  9:15 AM *    Specimens:                Specimens     ID Source Type Tests Collected By Collected At Frozen?      A Large Intestine, Transverse Colon Polyp · TISSUE PATHOLOGY EXAM   Lino Kahn MD 4/15/19 0911 No     Description: TRANSVERSE COLON POLYP ( COLD SNARE)                Drains:      Findings: Colonoscopy to the terminal ileum with normal ileum mucosa.  Distal transverse colon polyp removed cold snare.  Internal hemorrhoids noted as well.    Complications: None      Lino Kahn MD     Date: 4/15/2019  Time: 9:16 AM       Nutrition Services Brief Note: Pt seen for low BMI of 18.49. Took bedscale wt of 122.1 kg/ 269# today, previous wt/BMI appears to be inaccurate. Pt explains his UBW is ~ 260# with no recent wt loss and good intake PTA (eating three meals/day). Pt with no questions/concerns regarding nutrition. Diet= NPO. No further intervention needed at this time. Pt with hx of CHF and Hyperlipidemia, on Lasix.     Plan/Rec: Advance diet as medically feasible, recommend 2 gram Na+, Cardiac diet restrictions.  Nutrition Representative to see pt daily for snacks/meal preferences as appropriate with diet order. RD available PRN.

## 2019-05-14 ENCOUNTER — HOSPITAL ENCOUNTER (OUTPATIENT)
Dept: RADIOLOGY | Facility: MEDICAL CENTER | Age: 84
End: 2019-05-14

## 2019-07-16 ENCOUNTER — HOSPITAL ENCOUNTER (OUTPATIENT)
Dept: RADIOLOGY | Facility: MEDICAL CENTER | Age: 84
End: 2019-07-16
Attending: NEUROLOGICAL SURGERY
Payer: MEDICARE

## 2019-07-16 ENCOUNTER — HOSPITAL ENCOUNTER (OUTPATIENT)
Dept: RADIOLOGY | Facility: MEDICAL CENTER | Age: 84
End: 2019-07-16
Attending: PHYSICIAN ASSISTANT
Payer: MEDICARE

## 2019-07-16 DIAGNOSIS — S13.121S: ICD-10-CM

## 2019-07-16 DIAGNOSIS — S13.121A: ICD-10-CM

## 2019-07-16 PROCEDURE — 72125 CT NECK SPINE W/O DYE: CPT

## 2019-07-16 PROCEDURE — 72050 X-RAY EXAM NECK SPINE 4/5VWS: CPT
